# Patient Record
Sex: FEMALE | Race: BLACK OR AFRICAN AMERICAN | NOT HISPANIC OR LATINO
[De-identification: names, ages, dates, MRNs, and addresses within clinical notes are randomized per-mention and may not be internally consistent; named-entity substitution may affect disease eponyms.]

---

## 2018-07-27 PROBLEM — Z00.00 ENCOUNTER FOR PREVENTIVE HEALTH EXAMINATION: Status: ACTIVE | Noted: 2018-07-27

## 2018-08-03 ENCOUNTER — APPOINTMENT (OUTPATIENT)
Dept: VASCULAR SURGERY | Facility: CLINIC | Age: 73
End: 2018-08-03
Payer: MEDICARE

## 2018-08-03 VITALS
WEIGHT: 173 LBS | HEIGHT: 62 IN | BODY MASS INDEX: 31.83 KG/M2 | DIASTOLIC BLOOD PRESSURE: 69 MMHG | SYSTOLIC BLOOD PRESSURE: 108 MMHG | HEART RATE: 62 BPM

## 2018-08-03 DIAGNOSIS — Z86.79 PERSONAL HISTORY OF OTHER DISEASES OF THE CIRCULATORY SYSTEM: ICD-10-CM

## 2018-08-03 DIAGNOSIS — Z86.39 PERSONAL HISTORY OF OTHER ENDOCRINE, NUTRITIONAL AND METABOLIC DISEASE: ICD-10-CM

## 2018-08-03 DIAGNOSIS — I65.29 OCCLUSION AND STENOSIS OF UNSPECIFIED CAROTID ARTERY: ICD-10-CM

## 2018-08-03 PROCEDURE — 99203 OFFICE O/P NEW LOW 30 MIN: CPT

## 2018-08-03 RX ORDER — LOSARTAN POTASSIUM AND HYDROCHLOROTHIAZIDE 25; 100 MG/1; MG/1
100-25 TABLET ORAL
Refills: 0 | Status: ACTIVE | COMMUNITY

## 2018-08-03 RX ORDER — SIMVASTATIN 40 MG/1
40 TABLET, FILM COATED ORAL
Refills: 0 | Status: ACTIVE | COMMUNITY

## 2018-08-03 RX ORDER — NIFEDIPINE 90 MG
90 TABLET, EXTENDED RELEASE ORAL
Refills: 0 | Status: ACTIVE | COMMUNITY

## 2018-08-03 RX ORDER — ASPIRIN 81 MG
81 TABLET, DELAYED RELEASE (ENTERIC COATED) ORAL
Refills: 0 | Status: ACTIVE | COMMUNITY

## 2018-08-03 RX ORDER — METFORMIN HYDROCHLORIDE 500 MG/1
500 TABLET, COATED ORAL
Refills: 0 | Status: ACTIVE | COMMUNITY

## 2018-08-03 RX ORDER — ATENOLOL 50 MG/1
50 TABLET ORAL
Refills: 0 | Status: ACTIVE | COMMUNITY

## 2019-02-01 ENCOUNTER — APPOINTMENT (OUTPATIENT)
Dept: VASCULAR SURGERY | Facility: CLINIC | Age: 74
End: 2019-02-01
Payer: MEDICARE

## 2019-02-01 PROCEDURE — 93880 EXTRACRANIAL BILAT STUDY: CPT

## 2019-02-15 ENCOUNTER — APPOINTMENT (OUTPATIENT)
Dept: VASCULAR SURGERY | Facility: CLINIC | Age: 74
End: 2019-02-15
Payer: MEDICARE

## 2019-02-15 VITALS
BODY MASS INDEX: 31.83 KG/M2 | HEART RATE: 70 BPM | WEIGHT: 173 LBS | SYSTOLIC BLOOD PRESSURE: 148 MMHG | HEIGHT: 62 IN | DIASTOLIC BLOOD PRESSURE: 76 MMHG

## 2019-02-15 PROCEDURE — 99214 OFFICE O/P EST MOD 30 MIN: CPT

## 2019-02-15 NOTE — PHYSICAL EXAM
[JVD] : no jugular venous distention  [Normal Breath Sounds] : Normal breath sounds [Right Carotid Bruit] : right carotid bruit heard [Left Carotid Bruit] : left carotid bruit heard [2+] : left 2+ [Ankle Swelling (On Exam)] : not present [Varicose Veins Of Lower Extremities] : not present [] : not present [Abdomen Masses] : No abdominal masses [Skin Ulcer] : no ulcer [Oriented to Place] : oriented to place

## 2019-02-15 NOTE — HISTORY OF PRESENT ILLNESS
[FreeTextEntry1] : The patient with moderate to severe left carotid stenosis which is asymptomatic. Patient was also noted to have thyroid nodules. Patient currently undergoing workup for the thyroid nodules. Patient currently on antiplatelet therapy.

## 2019-02-15 NOTE — ASSESSMENT
[FreeTextEntry1] : Patient with moderate to severe asymptomatic left carotid stenosis. Patient needs left carotid endarterectomy. I would like to complete the workup for thyroid nodules at this time prior to intervention. This was discussed with the patient and primary doctor. Will followup in 3 weeks.

## 2019-03-12 ENCOUNTER — OUTPATIENT (OUTPATIENT)
Dept: OUTPATIENT SERVICES | Facility: HOSPITAL | Age: 74
LOS: 1 days | End: 2019-03-12
Payer: MEDICARE

## 2019-03-12 VITALS
TEMPERATURE: 98 F | DIASTOLIC BLOOD PRESSURE: 63 MMHG | HEIGHT: 62 IN | WEIGHT: 169.98 LBS | RESPIRATION RATE: 18 BRPM | HEART RATE: 54 BPM | OXYGEN SATURATION: 99 % | SYSTOLIC BLOOD PRESSURE: 157 MMHG

## 2019-03-12 DIAGNOSIS — Z01.818 ENCOUNTER FOR OTHER PREPROCEDURAL EXAMINATION: ICD-10-CM

## 2019-03-12 DIAGNOSIS — I65.29 OCCLUSION AND STENOSIS OF UNSPECIFIED CAROTID ARTERY: ICD-10-CM

## 2019-03-12 DIAGNOSIS — I10 ESSENTIAL (PRIMARY) HYPERTENSION: ICD-10-CM

## 2019-03-12 LAB
ALBUMIN SERPL ELPH-MCNC: 3.6 G/DL — SIGNIFICANT CHANGE UP (ref 3.5–5)
ALP SERPL-CCNC: 88 U/L — SIGNIFICANT CHANGE UP (ref 40–120)
ALT FLD-CCNC: 15 U/L DA — SIGNIFICANT CHANGE UP (ref 10–60)
ANION GAP SERPL CALC-SCNC: 7 MMOL/L — SIGNIFICANT CHANGE UP (ref 5–17)
APPEARANCE UR: CLEAR — SIGNIFICANT CHANGE UP
APTT BLD: 32.7 SEC — SIGNIFICANT CHANGE UP (ref 27.5–36.3)
AST SERPL-CCNC: 12 U/L — SIGNIFICANT CHANGE UP (ref 10–40)
BACTERIA # UR AUTO: ABNORMAL /HPF
BILIRUB SERPL-MCNC: 0.4 MG/DL — SIGNIFICANT CHANGE UP (ref 0.2–1.2)
BILIRUB UR-MCNC: NEGATIVE — SIGNIFICANT CHANGE UP
BLD GP AB SCN SERPL QL: SIGNIFICANT CHANGE UP
BUN SERPL-MCNC: 16 MG/DL — SIGNIFICANT CHANGE UP (ref 7–18)
CALCIUM SERPL-MCNC: 8.7 MG/DL — SIGNIFICANT CHANGE UP (ref 8.4–10.5)
CHLORIDE SERPL-SCNC: 111 MMOL/L — HIGH (ref 96–108)
CO2 SERPL-SCNC: 25 MMOL/L — SIGNIFICANT CHANGE UP (ref 22–31)
COLOR SPEC: YELLOW — SIGNIFICANT CHANGE UP
COMMENT - URINE: SIGNIFICANT CHANGE UP
CREAT SERPL-MCNC: 1.12 MG/DL — SIGNIFICANT CHANGE UP (ref 0.5–1.3)
DIFF PNL FLD: ABNORMAL
EPI CELLS # UR: SIGNIFICANT CHANGE UP /HPF
GLUCOSE SERPL-MCNC: 78 MG/DL — SIGNIFICANT CHANGE UP (ref 70–99)
GLUCOSE UR QL: NEGATIVE — SIGNIFICANT CHANGE UP
HBA1C BLD-MCNC: 6 % — HIGH (ref 4–5.6)
HCT VFR BLD CALC: 39.9 % — SIGNIFICANT CHANGE UP (ref 34.5–45)
HGB BLD-MCNC: 12.8 G/DL — SIGNIFICANT CHANGE UP (ref 11.5–15.5)
INR BLD: 0.99 RATIO — SIGNIFICANT CHANGE UP (ref 0.88–1.16)
KETONES UR-MCNC: NEGATIVE — SIGNIFICANT CHANGE UP
LEUKOCYTE ESTERASE UR-ACNC: ABNORMAL
MCHC RBC-ENTMCNC: 29.7 PG — SIGNIFICANT CHANGE UP (ref 27–34)
MCHC RBC-ENTMCNC: 32.1 GM/DL — SIGNIFICANT CHANGE UP (ref 32–36)
MCV RBC AUTO: 92.6 FL — SIGNIFICANT CHANGE UP (ref 80–100)
NITRITE UR-MCNC: NEGATIVE — SIGNIFICANT CHANGE UP
NRBC # BLD: 0 /100 WBCS — SIGNIFICANT CHANGE UP (ref 0–0)
PH UR: 5 — SIGNIFICANT CHANGE UP (ref 5–8)
PLATELET # BLD AUTO: 219 K/UL — SIGNIFICANT CHANGE UP (ref 150–400)
POTASSIUM SERPL-MCNC: 3.9 MMOL/L — SIGNIFICANT CHANGE UP (ref 3.5–5.3)
POTASSIUM SERPL-SCNC: 3.9 MMOL/L — SIGNIFICANT CHANGE UP (ref 3.5–5.3)
PROT SERPL-MCNC: 7.6 G/DL — SIGNIFICANT CHANGE UP (ref 6–8.3)
PROT UR-MCNC: 15
PROTHROM AB SERPL-ACNC: 11 SEC — SIGNIFICANT CHANGE UP (ref 10–12.9)
RBC # BLD: 4.31 M/UL — SIGNIFICANT CHANGE UP (ref 3.8–5.2)
RBC # FLD: 12.9 % — SIGNIFICANT CHANGE UP (ref 10.3–14.5)
RBC CASTS # UR COMP ASSIST: SIGNIFICANT CHANGE UP /HPF (ref 0–2)
SODIUM SERPL-SCNC: 143 MMOL/L — SIGNIFICANT CHANGE UP (ref 135–145)
SP GR SPEC: 1.02 — SIGNIFICANT CHANGE UP (ref 1.01–1.02)
T3 SERPL-MCNC: 84 NG/DL — SIGNIFICANT CHANGE UP (ref 80–200)
T4 AB SER-ACNC: 7.1 UG/DL — SIGNIFICANT CHANGE UP (ref 4.6–12)
TSH SERPL-MCNC: 0.65 UU/ML — SIGNIFICANT CHANGE UP (ref 0.34–4.82)
UROBILINOGEN FLD QL: 1
WBC # BLD: 7.1 K/UL — SIGNIFICANT CHANGE UP (ref 3.8–10.5)
WBC # FLD AUTO: 7.1 K/UL — SIGNIFICANT CHANGE UP (ref 3.8–10.5)
WBC UR QL: SIGNIFICANT CHANGE UP /HPF (ref 0–5)

## 2019-03-12 PROCEDURE — 36415 COLL VENOUS BLD VENIPUNCTURE: CPT

## 2019-03-12 PROCEDURE — 84443 ASSAY THYROID STIM HORMONE: CPT

## 2019-03-12 PROCEDURE — 84480 ASSAY TRIIODOTHYRONINE (T3): CPT

## 2019-03-12 PROCEDURE — 93005 ELECTROCARDIOGRAM TRACING: CPT

## 2019-03-12 PROCEDURE — 93306 TTE W/DOPPLER COMPLETE: CPT

## 2019-03-12 PROCEDURE — G0463: CPT

## 2019-03-12 PROCEDURE — 80053 COMPREHEN METABOLIC PANEL: CPT

## 2019-03-12 PROCEDURE — 81001 URINALYSIS AUTO W/SCOPE: CPT

## 2019-03-12 PROCEDURE — 86900 BLOOD TYPING SEROLOGIC ABO: CPT

## 2019-03-12 PROCEDURE — 83036 HEMOGLOBIN GLYCOSYLATED A1C: CPT

## 2019-03-12 PROCEDURE — 86901 BLOOD TYPING SEROLOGIC RH(D): CPT

## 2019-03-12 PROCEDURE — 86850 RBC ANTIBODY SCREEN: CPT

## 2019-03-12 PROCEDURE — 85610 PROTHROMBIN TIME: CPT

## 2019-03-12 PROCEDURE — 85027 COMPLETE CBC AUTOMATED: CPT

## 2019-03-12 PROCEDURE — 85730 THROMBOPLASTIN TIME PARTIAL: CPT

## 2019-03-12 PROCEDURE — 84436 ASSAY OF TOTAL THYROXINE: CPT

## 2019-03-12 NOTE — H&P PST ADULT - NSICDXPROBLEM_GEN_ALL_CORE_FT
PROBLEM DIAGNOSES  Problem: Hypertension  Assessment and Plan: Continue antihypertensive meds and take with sips of water on day of surgery.     Problem: Carotid stenosis  Assessment and Plan: left carotid endarectomy with electroencephalogram neuromonitoring on 3/28/2019 PROBLEM DIAGNOSES  Problem: Hypertension  Assessment and Plan: Continue antihypertensive meds and take with sips of water on day of surgery.     Problem: Carotid stenosis  Assessment and Plan: left carotid endarterectomy with electroencephalogram neuromonitoring on 3/28/2019 PROBLEM DIAGNOSES  Problem: Multiple thyroid nodules  Assessment and Plan: H/o FNA of left lower pole nodule on 2/22/2019. pt to return in 1 year to see endocridonologist and for repeat sonogram     Problem: DM (diabetes mellitus)  Assessment and Plan: Continue medication and take with sips of water on day of surgery. Perioperative glucose monitoring and cover as needed.    Problem: Hypertension  Assessment and Plan: Continue antihypertensive meds and take with sips of water on day of surgery.     Problem: Carotid stenosis  Assessment and Plan: left carotid endarterectomy with electroencephalogram neuromonitoring on 3/28/2019 PROBLEM DIAGNOSES  Problem: Multiple thyroid nodules  Assessment and Plan: H/o FNA of left lower pole nodule on 2/22/2019. pt to return in 1 year to see endocrinologist and for repeat sonogram     Problem: DM (diabetes mellitus)  Assessment and Plan: Continue medication and take with sips of water on day of surgery. Perioperative glucose monitoring and cover as needed.    Problem: Hypertension  Assessment and Plan: Continue antihypertensive meds and take with sips of water on day of surgery.     Problem: Carotid stenosis  Assessment and Plan: left carotid endarterectomy with electroencephalogram neuromonitoring on 3/28/2019

## 2019-03-12 NOTE — H&P PST ADULT - GASTROINTESTINAL DETAILS
bowel sounds normal/no bruit/no rebound tenderness/nontender/no distention/soft/no masses palpable/normal

## 2019-03-12 NOTE — H&P PST ADULT - NSANTHOSAYNRD_GEN_A_CORE
No. DONNY screening performed.  STOP BANG Legend: 0-2 = LOW Risk; 3-4 = INTERMEDIATE Risk; 5-8 = HIGH Risk

## 2019-03-12 NOTE — H&P PST ADULT - NEGATIVE GASTROINTESTINAL SYMPTOMS
no change in bowel habits/no flatulence/no abdominal pain/no diarrhea/no vomiting/no constipation/no nausea

## 2019-03-12 NOTE — H&P PST ADULT - RS GEN PE MLT RESP DETAILS PC
clear to auscultation bilaterally/no chest wall tenderness/no subcutaneous emphysema/no wheezes/no rhonchi/respirations non-labored/normal/breath sounds equal/good air movement/no rales/no intercostal retractions/airway patent

## 2019-03-12 NOTE — H&P PST ADULT - NSICDXPASTSURGICALHX_GEN_ALL_CORE_FT
PAST SURGICAL HISTORY:  History of left breast biopsy     Status post biopsy of thyroid gland FNA of thyroid nodules

## 2019-03-12 NOTE — H&P PST ADULT - ASSESSMENT
73 yr old female with PMH of HTN, HLD, thyroid nodules presents with left carotid artery occlusion and stenosis. Pt for left carotid endarterectomy with eletroencephalogram neuromonitoring on 3/28/2019. 73 yr old female with PMH of HTN, Hyperlipidemia, DM, PVD, thyroid nodules presents with left carotid artery occlusion and stenosis. Pt for left carotid endarterectomy with electroencephalogram neuromonitoring on 3/28/2019.

## 2019-03-12 NOTE — H&P PST ADULT - HISTORY OF PRESENT ILLNESS
73 yr old female with PMH of HTN, HLD presents with c/o stenosis of left carotid artery. pt denies any symptoms. pt for left carotid endarterectomy with eletroencephalogram neuromonitoring on 3/28/2019. 73 yr old female with PMH of HTN, Hyperlipidemia, DM, thyroid nodules, PVD presents with c/o stenosis of left carotid artery discovered incidentally during work-up for thyroid nodules. Pt denies any symptoms. Pt for left carotid endarterectomy with electroencephalogram neuromonitoring on 3/28/2019.

## 2019-03-12 NOTE — H&P PST ADULT - NEGATIVE CARDIOVASCULAR SYMPTOMS
no peripheral edema/no dyspnea on exertion/no palpitations/no chest pain/no claudication/no orthopnea/no paroxysmal nocturnal dyspnea

## 2019-03-12 NOTE — H&P PST ADULT - NSICDXPASTMEDICALHX_GEN_ALL_CORE_FT
PAST MEDICAL HISTORY:  Carotid artery stenosis     Hypertension PAST MEDICAL HISTORY:  Carotid artery stenosis     DM (diabetes mellitus)     Hypertension     Multiple thyroid nodules     PVD (peripheral vascular disease)

## 2019-03-15 ENCOUNTER — APPOINTMENT (OUTPATIENT)
Dept: VASCULAR SURGERY | Facility: CLINIC | Age: 74
End: 2019-03-15

## 2019-03-15 DIAGNOSIS — E04.2 NONTOXIC MULTINODULAR GOITER: ICD-10-CM

## 2019-03-15 DIAGNOSIS — E11.9 TYPE 2 DIABETES MELLITUS WITHOUT COMPLICATIONS: ICD-10-CM

## 2019-03-27 ENCOUNTER — TRANSCRIPTION ENCOUNTER (OUTPATIENT)
Age: 74
End: 2019-03-27

## 2019-03-27 PROBLEM — E04.2 NONTOXIC MULTINODULAR GOITER: Chronic | Status: ACTIVE | Noted: 2019-03-15

## 2019-03-27 PROBLEM — I65.29 OCCLUSION AND STENOSIS OF UNSPECIFIED CAROTID ARTERY: Chronic | Status: ACTIVE | Noted: 2019-03-12

## 2019-03-27 PROBLEM — I10 ESSENTIAL (PRIMARY) HYPERTENSION: Chronic | Status: ACTIVE | Noted: 2019-03-12

## 2019-03-27 PROBLEM — I73.9 PERIPHERAL VASCULAR DISEASE, UNSPECIFIED: Chronic | Status: ACTIVE | Noted: 2019-03-15

## 2019-03-27 PROBLEM — E11.9 TYPE 2 DIABETES MELLITUS WITHOUT COMPLICATIONS: Chronic | Status: ACTIVE | Noted: 2019-03-15

## 2019-03-27 RX ORDER — SODIUM CHLORIDE 9 MG/ML
3 INJECTION INTRAMUSCULAR; INTRAVENOUS; SUBCUTANEOUS EVERY 8 HOURS
Qty: 0 | Refills: 0 | Status: DISCONTINUED | OUTPATIENT
Start: 2019-03-28 | End: 2019-04-02

## 2019-03-28 ENCOUNTER — APPOINTMENT (OUTPATIENT)
Dept: VASCULAR SURGERY | Facility: HOSPITAL | Age: 74
End: 2019-03-28
Payer: MEDICARE

## 2019-03-28 ENCOUNTER — INPATIENT (INPATIENT)
Facility: HOSPITAL | Age: 74
LOS: 4 days | Discharge: HOME | DRG: 27 | End: 2019-04-02
Attending: SURGERY | Admitting: SURGERY
Payer: MEDICARE

## 2019-03-28 ENCOUNTER — RESULT REVIEW (OUTPATIENT)
Age: 74
End: 2019-03-28

## 2019-03-28 VITALS
OXYGEN SATURATION: 97 % | TEMPERATURE: 98 F | HEART RATE: 49 BPM | RESPIRATION RATE: 16 BRPM | DIASTOLIC BLOOD PRESSURE: 50 MMHG | SYSTOLIC BLOOD PRESSURE: 114 MMHG | WEIGHT: 169.98 LBS | HEIGHT: 62 IN

## 2019-03-28 DIAGNOSIS — Z98.890 OTHER SPECIFIED POSTPROCEDURAL STATES: Chronic | ICD-10-CM

## 2019-03-28 DIAGNOSIS — I65.29 OCCLUSION AND STENOSIS OF UNSPECIFIED CAROTID ARTERY: ICD-10-CM

## 2019-03-28 LAB
ANION GAP SERPL CALC-SCNC: 5 MMOL/L — SIGNIFICANT CHANGE UP (ref 5–17)
BASOPHILS # BLD AUTO: 0.02 K/UL — SIGNIFICANT CHANGE UP (ref 0–0.2)
BASOPHILS NFR BLD AUTO: 0.2 % — SIGNIFICANT CHANGE UP (ref 0–2)
BLD GP AB SCN SERPL QL: SIGNIFICANT CHANGE UP
BUN SERPL-MCNC: 19 MG/DL — HIGH (ref 7–18)
CALCIUM SERPL-MCNC: 8.8 MG/DL — SIGNIFICANT CHANGE UP (ref 8.4–10.5)
CHLORIDE SERPL-SCNC: 109 MMOL/L — HIGH (ref 96–108)
CHOLEST SERPL-MCNC: 196 MG/DL — SIGNIFICANT CHANGE UP (ref 10–199)
CO2 SERPL-SCNC: 25 MMOL/L — SIGNIFICANT CHANGE UP (ref 22–31)
CREAT SERPL-MCNC: 1.13 MG/DL — SIGNIFICANT CHANGE UP (ref 0.5–1.3)
EOSINOPHIL # BLD AUTO: 0 K/UL — SIGNIFICANT CHANGE UP (ref 0–0.5)
EOSINOPHIL NFR BLD AUTO: 0 % — SIGNIFICANT CHANGE UP (ref 0–6)
GLUCOSE BLDC GLUCOMTR-MCNC: 91 MG/DL — SIGNIFICANT CHANGE UP (ref 70–99)
GLUCOSE SERPL-MCNC: 138 MG/DL — HIGH (ref 70–99)
HCT VFR BLD CALC: 40.2 % — SIGNIFICANT CHANGE UP (ref 34.5–45)
HDLC SERPL-MCNC: 69 MG/DL — SIGNIFICANT CHANGE UP
HGB BLD-MCNC: 13 G/DL — SIGNIFICANT CHANGE UP (ref 11.5–15.5)
IMM GRANULOCYTES NFR BLD AUTO: 0.7 % — SIGNIFICANT CHANGE UP (ref 0–1.5)
INR BLD: 1.02 RATIO — SIGNIFICANT CHANGE UP (ref 0.88–1.16)
LIPID PNL WITH DIRECT LDL SERPL: 112 MG/DL — SIGNIFICANT CHANGE UP
LYMPHOCYTES # BLD AUTO: 0.83 K/UL — LOW (ref 1–3.3)
LYMPHOCYTES # BLD AUTO: 7.2 % — LOW (ref 13–44)
MAGNESIUM SERPL-MCNC: 1.8 MG/DL — SIGNIFICANT CHANGE UP (ref 1.6–2.6)
MAGNESIUM SERPL-MCNC: 1.8 MG/DL — SIGNIFICANT CHANGE UP (ref 1.6–2.6)
MCHC RBC-ENTMCNC: 29.7 PG — SIGNIFICANT CHANGE UP (ref 27–34)
MCHC RBC-ENTMCNC: 32.3 GM/DL — SIGNIFICANT CHANGE UP (ref 32–36)
MCV RBC AUTO: 92 FL — SIGNIFICANT CHANGE UP (ref 80–100)
MONOCYTES # BLD AUTO: 0.13 K/UL — SIGNIFICANT CHANGE UP (ref 0–0.9)
MONOCYTES NFR BLD AUTO: 1.1 % — LOW (ref 2–14)
NEUTROPHILS # BLD AUTO: 10.51 K/UL — HIGH (ref 1.8–7.4)
NEUTROPHILS NFR BLD AUTO: 90.8 % — HIGH (ref 43–77)
NRBC # BLD: 0 /100 WBCS — SIGNIFICANT CHANGE UP (ref 0–0)
PHOSPHATE SERPL-MCNC: 2.4 MG/DL — LOW (ref 2.5–4.5)
PHOSPHATE SERPL-MCNC: 3 MG/DL — SIGNIFICANT CHANGE UP (ref 2.5–4.5)
PLATELET # BLD AUTO: 221 K/UL — SIGNIFICANT CHANGE UP (ref 150–400)
POTASSIUM SERPL-MCNC: 4.2 MMOL/L — SIGNIFICANT CHANGE UP (ref 3.5–5.3)
POTASSIUM SERPL-SCNC: 4.2 MMOL/L — SIGNIFICANT CHANGE UP (ref 3.5–5.3)
PROTHROM AB SERPL-ACNC: 11.3 SEC — SIGNIFICANT CHANGE UP (ref 10–12.9)
RBC # BLD: 4.37 M/UL — SIGNIFICANT CHANGE UP (ref 3.8–5.2)
RBC # FLD: 12.9 % — SIGNIFICANT CHANGE UP (ref 10.3–14.5)
SODIUM SERPL-SCNC: 139 MMOL/L — SIGNIFICANT CHANGE UP (ref 135–145)
TOTAL CHOLESTEROL/HDL RATIO MEASUREMENT: 2.8 RATIO — LOW (ref 3.3–7.1)
TRIGL SERPL-MCNC: 77 MG/DL — SIGNIFICANT CHANGE UP (ref 10–149)
WBC # BLD: 11.57 K/UL — HIGH (ref 3.8–10.5)
WBC # FLD AUTO: 11.57 K/UL — HIGH (ref 3.8–10.5)

## 2019-03-28 PROCEDURE — 35301 RECHANNELING OF ARTERY: CPT | Mod: AS

## 2019-03-28 PROCEDURE — 35301 RECHANNELING OF ARTERY: CPT

## 2019-03-28 RX ORDER — ATENOLOL 25 MG/1
50 TABLET ORAL DAILY
Qty: 0 | Refills: 0 | Status: DISCONTINUED | OUTPATIENT
Start: 2019-03-28 | End: 2019-03-30

## 2019-03-28 RX ORDER — SIMVASTATIN 20 MG/1
40 TABLET, FILM COATED ORAL AT BEDTIME
Qty: 0 | Refills: 0 | Status: DISCONTINUED | OUTPATIENT
Start: 2019-03-28 | End: 2019-04-02

## 2019-03-28 RX ORDER — GLUCAGON INJECTION, SOLUTION 0.5 MG/.1ML
1 INJECTION, SOLUTION SUBCUTANEOUS ONCE
Qty: 0 | Refills: 0 | Status: DISCONTINUED | OUTPATIENT
Start: 2019-03-28 | End: 2019-03-28

## 2019-03-28 RX ORDER — ASPIRIN/CALCIUM CARB/MAGNESIUM 324 MG
81 TABLET ORAL DAILY
Qty: 0 | Refills: 0 | Status: DISCONTINUED | OUTPATIENT
Start: 2019-03-28 | End: 2019-04-02

## 2019-03-28 RX ORDER — SODIUM CHLORIDE 9 MG/ML
1000 INJECTION, SOLUTION INTRAVENOUS
Qty: 0 | Refills: 0 | Status: DISCONTINUED | OUTPATIENT
Start: 2019-03-28 | End: 2019-03-28

## 2019-03-28 RX ORDER — METOPROLOL TARTRATE 50 MG
2.5 TABLET ORAL ONCE
Qty: 0 | Refills: 0 | Status: COMPLETED | OUTPATIENT
Start: 2019-03-28 | End: 2019-03-28

## 2019-03-28 RX ORDER — HYDROMORPHONE HYDROCHLORIDE 2 MG/ML
0.5 INJECTION INTRAMUSCULAR; INTRAVENOUS; SUBCUTANEOUS
Qty: 0 | Refills: 0 | Status: DISCONTINUED | OUTPATIENT
Start: 2019-03-28 | End: 2019-03-28

## 2019-03-28 RX ORDER — LABETALOL HCL 100 MG
10 TABLET ORAL ONCE
Qty: 0 | Refills: 0 | Status: COMPLETED | OUTPATIENT
Start: 2019-03-28 | End: 2019-03-28

## 2019-03-28 RX ORDER — ATENOLOL 25 MG/1
50 TABLET ORAL DAILY
Qty: 0 | Refills: 0 | Status: DISCONTINUED | OUTPATIENT
Start: 2019-03-28 | End: 2019-03-28

## 2019-03-28 RX ORDER — MAGNESIUM SULFATE 500 MG/ML
1 VIAL (ML) INJECTION ONCE
Qty: 0 | Refills: 0 | Status: COMPLETED | OUTPATIENT
Start: 2019-03-28 | End: 2019-03-28

## 2019-03-28 RX ORDER — LOSARTAN POTASSIUM 100 MG/1
100 TABLET, FILM COATED ORAL DAILY
Qty: 0 | Refills: 0 | Status: DISCONTINUED | OUTPATIENT
Start: 2019-03-28 | End: 2019-03-30

## 2019-03-28 RX ORDER — DEXTROSE 50 % IN WATER 50 %
25 SYRINGE (ML) INTRAVENOUS ONCE
Qty: 0 | Refills: 0 | Status: DISCONTINUED | OUTPATIENT
Start: 2019-03-28 | End: 2019-03-28

## 2019-03-28 RX ORDER — ACETAMINOPHEN 500 MG
1000 TABLET ORAL ONCE
Qty: 0 | Refills: 0 | Status: DISCONTINUED | OUTPATIENT
Start: 2019-03-28 | End: 2019-03-28

## 2019-03-28 RX ORDER — ONDANSETRON 8 MG/1
4 TABLET, FILM COATED ORAL ONCE
Qty: 0 | Refills: 0 | Status: DISCONTINUED | OUTPATIENT
Start: 2019-03-28 | End: 2019-03-28

## 2019-03-28 RX ORDER — CHLORHEXIDINE GLUCONATE 213 G/1000ML
1 SOLUTION TOPICAL DAILY
Qty: 0 | Refills: 0 | Status: DISCONTINUED | OUTPATIENT
Start: 2019-03-28 | End: 2019-03-28

## 2019-03-28 RX ORDER — LOSARTAN POTASSIUM 100 MG/1
1 TABLET, FILM COATED ORAL
Qty: 0 | Refills: 0 | COMMUNITY

## 2019-03-28 RX ORDER — SIMVASTATIN 20 MG/1
1 TABLET, FILM COATED ORAL
Qty: 0 | Refills: 0 | COMMUNITY

## 2019-03-28 RX ORDER — CHLORHEXIDINE GLUCONATE 213 G/1000ML
1 SOLUTION TOPICAL EVERY 12 HOURS
Qty: 0 | Refills: 0 | Status: DISCONTINUED | OUTPATIENT
Start: 2019-03-28 | End: 2019-04-02

## 2019-03-28 RX ORDER — INSULIN LISPRO 100/ML
VIAL (ML) SUBCUTANEOUS
Qty: 0 | Refills: 0 | Status: DISCONTINUED | OUTPATIENT
Start: 2019-03-28 | End: 2019-04-02

## 2019-03-28 RX ORDER — DEXTROSE 50 % IN WATER 50 %
15 SYRINGE (ML) INTRAVENOUS ONCE
Qty: 0 | Refills: 0 | Status: DISCONTINUED | OUTPATIENT
Start: 2019-03-28 | End: 2019-03-28

## 2019-03-28 RX ORDER — SODIUM CHLORIDE 9 MG/ML
1000 INJECTION INTRAMUSCULAR; INTRAVENOUS; SUBCUTANEOUS
Qty: 0 | Refills: 0 | Status: DISCONTINUED | OUTPATIENT
Start: 2019-03-28 | End: 2019-03-28

## 2019-03-28 RX ORDER — NIFEDIPINE 30 MG
1 TABLET, EXTENDED RELEASE 24 HR ORAL
Qty: 0 | Refills: 0 | COMMUNITY

## 2019-03-28 RX ORDER — DEXTROSE 50 % IN WATER 50 %
12.5 SYRINGE (ML) INTRAVENOUS ONCE
Qty: 0 | Refills: 0 | Status: DISCONTINUED | OUTPATIENT
Start: 2019-03-28 | End: 2019-03-28

## 2019-03-28 RX ORDER — ATENOLOL 25 MG/1
1 TABLET ORAL
Qty: 0 | Refills: 0 | COMMUNITY

## 2019-03-28 RX ORDER — NICARDIPINE HYDROCHLORIDE 30 MG/1
5 CAPSULE, EXTENDED RELEASE ORAL
Qty: 40 | Refills: 0 | Status: DISCONTINUED | OUTPATIENT
Start: 2019-03-28 | End: 2019-03-30

## 2019-03-28 RX ORDER — NIFEDIPINE 30 MG
90 TABLET, EXTENDED RELEASE 24 HR ORAL AT BEDTIME
Qty: 0 | Refills: 0 | Status: DISCONTINUED | OUTPATIENT
Start: 2019-03-28 | End: 2019-03-28

## 2019-03-28 RX ORDER — ASPIRIN/CALCIUM CARB/MAGNESIUM 324 MG
1 TABLET ORAL
Qty: 0 | Refills: 0 | COMMUNITY

## 2019-03-28 RX ORDER — PROTAMINE SULFATE 10 MG/ML
50 AMPUL (ML) INTRAVENOUS ONCE
Qty: 0 | Refills: 0 | Status: COMPLETED | OUTPATIENT
Start: 2019-03-28 | End: 2019-03-28

## 2019-03-28 RX ORDER — METFORMIN HYDROCHLORIDE 850 MG/1
1 TABLET ORAL
Qty: 0 | Refills: 0 | COMMUNITY

## 2019-03-28 RX ADMIN — Medication 50 MILLIGRAM(S): at 13:05

## 2019-03-28 RX ADMIN — Medication 100 GRAM(S): at 15:48

## 2019-03-28 RX ADMIN — NICARDIPINE HYDROCHLORIDE 25 MG/HR: 30 CAPSULE, EXTENDED RELEASE ORAL at 16:35

## 2019-03-28 RX ADMIN — CHLORHEXIDINE GLUCONATE 1 APPLICATION(S): 213 SOLUTION TOPICAL at 15:42

## 2019-03-28 RX ADMIN — SODIUM CHLORIDE 3 MILLILITER(S): 9 INJECTION INTRAMUSCULAR; INTRAVENOUS; SUBCUTANEOUS at 20:50

## 2019-03-28 RX ADMIN — LOSARTAN POTASSIUM 100 MILLIGRAM(S): 100 TABLET, FILM COATED ORAL at 13:11

## 2019-03-28 RX ADMIN — Medication 10 MILLIGRAM(S): at 16:17

## 2019-03-28 RX ADMIN — CHLORHEXIDINE GLUCONATE 1 APPLICATION(S): 213 SOLUTION TOPICAL at 08:05

## 2019-03-28 RX ADMIN — SODIUM CHLORIDE 3 MILLILITER(S): 9 INJECTION INTRAMUSCULAR; INTRAVENOUS; SUBCUTANEOUS at 07:42

## 2019-03-28 RX ADMIN — SODIUM CHLORIDE 85 MILLILITER(S): 9 INJECTION, SOLUTION INTRAVENOUS at 12:59

## 2019-03-28 RX ADMIN — Medication 2.5 MILLIGRAM(S): at 13:09

## 2019-03-28 RX ADMIN — ATENOLOL 50 MILLIGRAM(S): 25 TABLET ORAL at 15:48

## 2019-03-28 RX ADMIN — SIMVASTATIN 40 MILLIGRAM(S): 20 TABLET, FILM COATED ORAL at 21:02

## 2019-03-28 NOTE — CONSULT NOTE ADULT - ATTENDING COMMENTS
Patient seen and examined with resident, Addendum to above.    72 y/o female with history of DM. HTN, HLD incidentally found to have carotid stenosis of left side. S/p left Carotid endarterectomy. Admitted to the ICU for post operative monitoring.    Assessment:  1. Left carotid artery stenosis - s/p left Carotid endarterectomy. Neurovascular checks. Maintain BP between 110 - 160 systolic. Vascular surgery follow up. Monitor TIARA drain output. Oral diet for dinner.   2. DM - hold home oral medications. Fingerstick monitoring. Basal/bolus insulin regimen  3. HTN - BP control cont. home medications.   4. HLD - cont. statin     - DVT prophylaxis   - Oral diet

## 2019-03-28 NOTE — PROGRESS NOTE ADULT - SUBJECTIVE AND OBJECTIVE BOX
S/P Left Carotid endarterectomy POD#0  73y old Female seen and examined at bedside. Denies chest pain, shortness of breath, nausea/ vomiting, and dizziness. Denies neck pain, numbness tingling, loss of sensation, difficulty swallowing.     Vital Signs Last 24 Hrs  T(F): 98 (03-28-19 @ 16:30), Max: 98 (03-28-19 @ 07:45)  HR: 64 (03-28-19 @ 19:00)  BP: 113/40 (03-28-19 @ 19:00)  RR: 15 (03-28-19 @ 19:00)  SpO2: 100% (03-28-19 @ 19:00)  POCT Blood Glucose.: 127 mg/dL (28 Mar 2019 15:57)    GENERAL: Alert, NAD  CHEST/LUNG: respirations nonlabored  NECK: Left surgical dressing in place with small amount of blood staining, stable. TIARA with small amount of sanguineous drainage in bulb. No edema or swelling of the neck. NVI.   EXTREMITIES:  no calf tenderness, No edema, intermittent compression devices in place bilaterally                          13.0   11.57 )-----------( 221      ( 28 Mar 2019 14:02 )             40.2     03-28    139  |  109<H>  |  19<H>  ----------------------------<  138<H>  4.2   |  25  |  1.13    Ca    8.8      28 Mar 2019 14:02  Phos  3.0     03-28  Mg     1.8     03-28 S/P Left Carotid endarterectomy POD#0  73y old Female seen and examined at bedside. Denies chest pain, shortness of breath, nausea/ vomiting, headache and dizziness. Denies neck pain, numbness tingling, loss of sensation, difficulty swallowing.     Vital Signs Last 24 Hrs  T(F): 98 (03-28-19 @ 16:30), Max: 98 (03-28-19 @ 07:45)  HR: 64 (03-28-19 @ 19:00)  BP: 113/40 (03-28-19 @ 19:00)  RR: 15 (03-28-19 @ 19:00)  SpO2: 100% (03-28-19 @ 19:00)  POCT Blood Glucose.: 127 mg/dL (28 Mar 2019 15:57)    GENERAL: Alert, NAD  CHEST/LUNG: respirations nonlabored  NECK: Left surgical dressing in place with small amount of blood staining, stable. TIARA with small amount of sanguineous drainage in bulb. No edema or swelling of the neck. NVI.   EXTREMITIES:  no calf tenderness, No edema, intermittent compression devices in place bilaterally                          13.0   11.57 )-----------( 221      ( 28 Mar 2019 14:02 )             40.2     03-28    139  |  109<H>  |  19<H>  ----------------------------<  138<H>  4.2   |  25  |  1.13    Ca    8.8      28 Mar 2019 14:02  Phos  3.0     03-28  Mg     1.8     03-28

## 2019-03-28 NOTE — CONSULT NOTE ADULT - SUBJECTIVE AND OBJECTIVE BOX
73 yr old female with PMH of HTN, Hyperlipidemia, DM, thyroid nodules, PVD presents with c/o stenosis of left carotid artery discovered incidentally during work-up for thyroid nodules. Pt denies any symptoms as per surgery team. Pt freceived left carotid endarterectomy with electroencephalogram neuromonitoring on 3/28/2019. Patient was sent to ICU for post op monitoring. Estimated blood loss of 100ml and patient received 1L IV resuscitation during procedure. Pt intubated and extubated for surgery. Patient denied pain in the surgical site, headache, dizziness, visual and auditory hallucination, chest pain, palpitation, and any other symptoms.     REVIEW OF SYSTEMS:    CONSTITUTIONAL: No weakness, fevers or chills  EYES/ENT: No visual changes;  No vertigo or throat pain   NECK: No pain or stiffness  RESPIRATORY: No cough, wheezing, hemoptysis; No shortness of breath  CARDIOVASCULAR: No chest pain or palpitations  GASTROINTESTINAL: No abdominal or epigastric pain. No nausea, vomiting, or hematemesis; No diarrhea or constipation. No melena or hematochezia.  GENITOURINARY: No dysuria, frequency or hematuria  NEUROLOGICAL: No numbness or weakness  SKIN: No itching, rashes  No other complaint except mentioned as above.     PHYSICAL EXAM:    GENERAL: NAD, well-developed    HEAD:  Atraumatic, Normocephalic    EYES: EOMI, PERRLA, conjunctiva and sclera clear    NECK: Supple, No JVD, surgical site clean, no evident of bleeding, drain in place and empty    CHEST/LUNG: Clear to auscultation bilaterally; No wheeze    HEART: Regular rate and rhythm; No murmurs, rubs, or gallops    ABDOMEN: Soft, Nontender, Nondistended; Bowel sounds present    EXTREMITIES:  2+ Peripheral Pulses, No clubbing, cyanosis, or edema    PSYCH: AAOx3    NEUROLOGY: non-focal    SKIN: No rashes or lesions    No other pertinent positive finding except mentioned as above.           PAST MEDICAL & SURGICAL HISTORY:  PVD (peripheral vascular disease)  Multiple thyroid nodules  DM (diabetes mellitus)  Hypertension  Carotid artery stenosis  Status post biopsy of thyroid gland: FNA of thyroid nodules  History of left breast biopsy      No Known Allergies      Meds:  acetaminophen  IVPB .. 1000 milliGRAM(s) IV Intermittent once  aspirin enteric coated 81 milliGRAM(s) Oral daily  ATENolol  Tablet 50 milliGRAM(s) Oral daily  chlorhexidine 2% Cloths 1 Application(s) Topical daily  HYDROmorphone  Injectable 0.5 milliGRAM(s) IV Push every 10 minutes PRN  insulin lispro (HumaLOG) corrective regimen sliding scale   SubCutaneous three times a day before meals  lactated ringers. 1000 milliLiter(s) IV Continuous <Continuous>  losartan 100 milliGRAM(s) Oral daily  NIFEdipine XL 90 milliGRAM(s) Oral at bedtime  ondansetron Injectable 4 milliGRAM(s) IV Push once PRN  protamine Injectable 50 milliGRAM(s) IV Push once  simvastatin 40 milliGRAM(s) Oral at bedtime  sodium chloride 0.9% lock flush 3 milliLiter(s) IV Push every 8 hours      SOCIAL HISTORY:  Smoker:  YES / NO        PACK YEARS:                         WHEN QUIT?  ETOH use:  YES / NO               FREQUENCY / QUANTITY:  Ilicit Drug use:  YES / NO  Occupation:  Assisted device use (Cane / Walker):  Live with:    FAMILY HISTORY:  FHx: diabetes mellitus      VITALS:  Vital Signs Last 24 Hrs  T(C): 36.6 (28 Mar 2019 10:54), Max: 36.7 (28 Mar 2019 07:45)  T(F): 97.9 (28 Mar 2019 10:54), Max: 98 (28 Mar 2019 07:45)  HR: 74 (28 Mar 2019 12:10) (49 - 74)  BP: 137/56 (28 Mar 2019 12:10) (114/50 - 141/56)  BP(mean): --  RR: 18 (28 Mar 2019 12:10) (12 - 21)  SpO2: 100% (28 Mar 2019 12:10) (97% - 100%)    LABS/DIAGNOSTIC TESTS:            Mg     1.8     03-28 73 yr old female with PMH of HTN, Hyperlipidemia, DM, thyroid nodules, PVD presents with c/o stenosis of left carotid artery discovered incidentally during work-up for thyroid nodules. Pt denies any symptoms as per surgery team. Pt freceived left carotid endarterectomy with electroencephalogram neuromonitoring on 3/28/2019. Patient was sent to ICU for post op monitoring. Estimated blood loss of 100ml and patient received 1L IV resuscitation during procedure. Pt intubated and extubated for surgery. Patient denied pain in the surgical site, headache, dizziness, visual and auditory hallucination, chest pain, palpitation, and any other symptoms.     PAST MEDICAL & SURGICAL HISTORY:  PVD (peripheral vascular disease)  Multiple thyroid nodules  DM (diabetes mellitus)  Hypertension  Carotid artery stenosis  Status post biopsy of thyroid gland: FNA of thyroid nodules  History of left breast biopsy    FAMILY HISTORY:  FHx: diabetes mellitus    SOCIAL HISTORY: Denies smoking, ETOH use or drug use    REVIEW OF SYSTEMS:    CONSTITUTIONAL: No weakness, fevers or chills  EYES/ENT: No visual changes;  No vertigo or throat pain   NECK: No pain or stiffness  RESPIRATORY: No cough, wheezing, hemoptysis; No shortness of breath  CARDIOVASCULAR: No chest pain or palpitations  GASTROINTESTINAL: No abdominal or epigastric pain. No nausea, vomiting, or hematemesis; No diarrhea or constipation. No melena or hematochezia.  GENITOURINARY: No dysuria, frequency or hematuria  NEUROLOGICAL: No numbness or weakness  SKIN: No itching, rashes  No other complaint except mentioned as above.     PHYSICAL EXAM:    GENERAL: NAD, well-developed    HEAD:  Atraumatic, Normocephalic    EYES: EOMI, PERRLA, conjunctiva and sclera clear    NECK: Supple, No JVD, surgical site clean, no evident of bleeding, drain in place and empty    CHEST/LUNG: Clear to auscultation bilaterally; No wheeze    HEART: Regular rate and rhythm; No murmurs, rubs, or gallops    ABDOMEN: Soft, Nontender, Nondistended; Bowel sounds present    EXTREMITIES:  2+ Peripheral Pulses, No clubbing, cyanosis, or edema    PSYCH: AAOx3    NEUROLOGY: non-focal, bilateral upper and lower extremity strenght 5/5. CN II - XII grossly intact    SKIN: No rashes or lesions    No other pertinent positive finding except mentioned as above.           PAST MEDICAL & SURGICAL HISTORY:  PVD (peripheral vascular disease)  Multiple thyroid nodules  DM (diabetes mellitus)  Hypertension  Carotid artery stenosis  Status post biopsy of thyroid gland: FNA of thyroid nodules  History of left breast biopsy      No Known Allergies      Meds:  acetaminophen  IVPB .. 1000 milliGRAM(s) IV Intermittent once  aspirin enteric coated 81 milliGRAM(s) Oral daily  ATENolol  Tablet 50 milliGRAM(s) Oral daily  chlorhexidine 2% Cloths 1 Application(s) Topical daily  HYDROmorphone  Injectable 0.5 milliGRAM(s) IV Push every 10 minutes PRN  insulin lispro (HumaLOG) corrective regimen sliding scale   SubCutaneous three times a day before meals  lactated ringers. 1000 milliLiter(s) IV Continuous <Continuous>  losartan 100 milliGRAM(s) Oral daily  NIFEdipine XL 90 milliGRAM(s) Oral at bedtime  ondansetron Injectable 4 milliGRAM(s) IV Push once PRN  protamine Injectable 50 milliGRAM(s) IV Push once  simvastatin 40 milliGRAM(s) Oral at bedtime  sodium chloride 0.9% lock flush 3 milliLiter(s) IV Push every 8 hours      SOCIAL HISTORY:  Smoker:  YES / NO        PACK YEARS:                         WHEN QUIT?  ETOH use:  YES / NO               FREQUENCY / QUANTITY:  Ilicit Drug use:  YES / NO  Occupation:  Assisted device use (Cane / Walker):  Live with:    FAMILY HISTORY:  FHx: diabetes mellitus      VITALS:  Vital Signs Last 24 Hrs  T(C): 36.6 (28 Mar 2019 10:54), Max: 36.7 (28 Mar 2019 07:45)  T(F): 97.9 (28 Mar 2019 10:54), Max: 98 (28 Mar 2019 07:45)  HR: 74 (28 Mar 2019 12:10) (49 - 74)  BP: 137/56 (28 Mar 2019 12:10) (114/50 - 141/56)  BP(mean): --  RR: 18 (28 Mar 2019 12:10) (12 - 21)  SpO2: 100% (28 Mar 2019 12:10) (97% - 100%)    LABS/DIAGNOSTIC TESTS:            Mg     1.8     03-28

## 2019-03-28 NOTE — ASU PREOP CHECKLIST - SELECT TESTS ORDERED
POCT Blood Glucose/Results in MD note/Type and Screen/PT/PTT/Type and Cross/fingerstick 91/INR/EKG/CXR/BMP/CBC

## 2019-03-28 NOTE — PROGRESS NOTE ADULT - ASSESSMENT
73y old Female s/p  Left Carotid endarterectomy POD#0 with PMH PVD, DM, Hypertension    - local wound care  - continue TIARA drain and monitor output  - keep SBP between 110-160  - DVT prophylaxis, Incentive Spirometer, OOB, Ambulating, pain control  - continue current management per ICU

## 2019-03-29 LAB
ALBUMIN SERPL ELPH-MCNC: 3.2 G/DL — LOW (ref 3.5–5)
ALP SERPL-CCNC: 71 U/L — SIGNIFICANT CHANGE UP (ref 40–120)
ALT FLD-CCNC: 14 U/L DA — SIGNIFICANT CHANGE UP (ref 10–60)
ANION GAP SERPL CALC-SCNC: 7 MMOL/L — SIGNIFICANT CHANGE UP (ref 5–17)
AST SERPL-CCNC: 16 U/L — SIGNIFICANT CHANGE UP (ref 10–40)
BASOPHILS # BLD AUTO: 0.01 K/UL — SIGNIFICANT CHANGE UP (ref 0–0.2)
BASOPHILS NFR BLD AUTO: 0.1 % — SIGNIFICANT CHANGE UP (ref 0–2)
BILIRUB SERPL-MCNC: 0.5 MG/DL — SIGNIFICANT CHANGE UP (ref 0.2–1.2)
BUN SERPL-MCNC: 22 MG/DL — HIGH (ref 7–18)
CALCIUM SERPL-MCNC: 8.4 MG/DL — SIGNIFICANT CHANGE UP (ref 8.4–10.5)
CHLORIDE SERPL-SCNC: 109 MMOL/L — HIGH (ref 96–108)
CO2 SERPL-SCNC: 24 MMOL/L — SIGNIFICANT CHANGE UP (ref 22–31)
CREAT SERPL-MCNC: 1.12 MG/DL — SIGNIFICANT CHANGE UP (ref 0.5–1.3)
EOSINOPHIL # BLD AUTO: 0.01 K/UL — SIGNIFICANT CHANGE UP (ref 0–0.5)
EOSINOPHIL NFR BLD AUTO: 0.1 % — SIGNIFICANT CHANGE UP (ref 0–6)
GLUCOSE SERPL-MCNC: 86 MG/DL — SIGNIFICANT CHANGE UP (ref 70–99)
HCT VFR BLD CALC: 38 % — SIGNIFICANT CHANGE UP (ref 34.5–45)
HGB BLD-MCNC: 12.2 G/DL — SIGNIFICANT CHANGE UP (ref 11.5–15.5)
IMM GRANULOCYTES NFR BLD AUTO: 0.6 % — SIGNIFICANT CHANGE UP (ref 0–1.5)
LYMPHOCYTES # BLD AUTO: 1.68 K/UL — SIGNIFICANT CHANGE UP (ref 1–3.3)
LYMPHOCYTES # BLD AUTO: 12.8 % — LOW (ref 13–44)
MAGNESIUM SERPL-MCNC: 2.1 MG/DL — SIGNIFICANT CHANGE UP (ref 1.6–2.6)
MCHC RBC-ENTMCNC: 29.5 PG — SIGNIFICANT CHANGE UP (ref 27–34)
MCHC RBC-ENTMCNC: 32.1 GM/DL — SIGNIFICANT CHANGE UP (ref 32–36)
MCV RBC AUTO: 91.8 FL — SIGNIFICANT CHANGE UP (ref 80–100)
MONOCYTES # BLD AUTO: 0.99 K/UL — HIGH (ref 0–0.9)
MONOCYTES NFR BLD AUTO: 7.5 % — SIGNIFICANT CHANGE UP (ref 2–14)
NEUTROPHILS # BLD AUTO: 10.36 K/UL — HIGH (ref 1.8–7.4)
NEUTROPHILS NFR BLD AUTO: 78.9 % — HIGH (ref 43–77)
NRBC # BLD: 0 /100 WBCS — SIGNIFICANT CHANGE UP (ref 0–0)
PHOSPHATE SERPL-MCNC: 2.8 MG/DL — SIGNIFICANT CHANGE UP (ref 2.5–4.5)
PLATELET # BLD AUTO: 154 K/UL — SIGNIFICANT CHANGE UP (ref 150–400)
POTASSIUM SERPL-MCNC: 4.1 MMOL/L — SIGNIFICANT CHANGE UP (ref 3.5–5.3)
POTASSIUM SERPL-SCNC: 4.1 MMOL/L — SIGNIFICANT CHANGE UP (ref 3.5–5.3)
PROT SERPL-MCNC: 6.8 G/DL — SIGNIFICANT CHANGE UP (ref 6–8.3)
RBC # BLD: 4.14 M/UL — SIGNIFICANT CHANGE UP (ref 3.8–5.2)
RBC # FLD: 13.1 % — SIGNIFICANT CHANGE UP (ref 10.3–14.5)
SODIUM SERPL-SCNC: 140 MMOL/L — SIGNIFICANT CHANGE UP (ref 135–145)
WBC # BLD: 13.13 K/UL — HIGH (ref 3.8–10.5)
WBC # FLD AUTO: 13.13 K/UL — HIGH (ref 3.8–10.5)

## 2019-03-29 RX ORDER — HYDRALAZINE HCL 50 MG
25 TABLET ORAL EVERY 8 HOURS
Qty: 0 | Refills: 0 | Status: DISCONTINUED | OUTPATIENT
Start: 2019-03-29 | End: 2019-03-30

## 2019-03-29 RX ORDER — HEPARIN SODIUM 5000 [USP'U]/ML
5000 INJECTION INTRAVENOUS; SUBCUTANEOUS EVERY 8 HOURS
Qty: 0 | Refills: 0 | Status: DISCONTINUED | OUTPATIENT
Start: 2019-03-29 | End: 2019-04-02

## 2019-03-29 RX ADMIN — SODIUM CHLORIDE 3 MILLILITER(S): 9 INJECTION INTRAMUSCULAR; INTRAVENOUS; SUBCUTANEOUS at 21:06

## 2019-03-29 RX ADMIN — HEPARIN SODIUM 5000 UNIT(S): 5000 INJECTION INTRAVENOUS; SUBCUTANEOUS at 13:29

## 2019-03-29 RX ADMIN — ATENOLOL 50 MILLIGRAM(S): 25 TABLET ORAL at 05:10

## 2019-03-29 RX ADMIN — CHLORHEXIDINE GLUCONATE 1 APPLICATION(S): 213 SOLUTION TOPICAL at 05:10

## 2019-03-29 RX ADMIN — NICARDIPINE HYDROCHLORIDE 25 MG/HR: 30 CAPSULE, EXTENDED RELEASE ORAL at 17:34

## 2019-03-29 RX ADMIN — LOSARTAN POTASSIUM 100 MILLIGRAM(S): 100 TABLET, FILM COATED ORAL at 05:10

## 2019-03-29 RX ADMIN — SIMVASTATIN 40 MILLIGRAM(S): 20 TABLET, FILM COATED ORAL at 21:06

## 2019-03-29 RX ADMIN — SODIUM CHLORIDE 3 MILLILITER(S): 9 INJECTION INTRAMUSCULAR; INTRAVENOUS; SUBCUTANEOUS at 08:21

## 2019-03-29 RX ADMIN — SODIUM CHLORIDE 3 MILLILITER(S): 9 INJECTION INTRAMUSCULAR; INTRAVENOUS; SUBCUTANEOUS at 05:11

## 2019-03-29 RX ADMIN — Medication 25 MILLIGRAM(S): at 13:30

## 2019-03-29 RX ADMIN — Medication 25 MILLIGRAM(S): at 21:06

## 2019-03-29 RX ADMIN — HEPARIN SODIUM 5000 UNIT(S): 5000 INJECTION INTRAVENOUS; SUBCUTANEOUS at 21:06

## 2019-03-29 RX ADMIN — Medication 81 MILLIGRAM(S): at 10:37

## 2019-03-29 RX ADMIN — Medication 25 MILLIGRAM(S): at 10:40

## 2019-03-29 RX ADMIN — CHLORHEXIDINE GLUCONATE 1 APPLICATION(S): 213 SOLUTION TOPICAL at 13:28

## 2019-03-29 NOTE — PROGRESS NOTE ADULT - ASSESSMENT
73 F with HTN DM HLD PVD admitted to ICU for post op monitoring after left carotid endarterectomy on 3/28.     1)Post op monitoring  Neuro check and pulse check Q2 hour  BP goal SBP between 100-150  was uncontrolled on oral meds and hence started on nicardipine drip   patient on aspirin and atorvastatin  pt already received protamine sulphate in PACU as per surgical PA  Restarted asa, hep sc    2)Hypertensive urgency   was uncontrolled on oral meds and hence started on nicardipine drip   will monitor BP closely  BP goal SBP between 100-150    3)DM   on metformin at home  will keep on HSS and accu check   A1c : 6    4)HLD and PVD  atorvastatin and aspirin    5)DVt PPx with hep sc and gi ppx with pepcid

## 2019-03-29 NOTE — PHYSICAL THERAPY INITIAL EVALUATION ADULT - DIAGNOSIS, PT EVAL
Immobility for 2 days; general weakness, increased trunk sway with ambulation secondary to s/p L Carotid endarterectomy POD#1

## 2019-03-29 NOTE — PROGRESS NOTE ADULT - SUBJECTIVE AND OBJECTIVE BOX
INTERVAL HPI/OVERNIGHT EVENTS:    S/P Left Carotid endarterectomy POD#1, pt seen and examined at bedside. Offers no acute complaints at this time, admits to min incisional pain. Denies nausea/ vomiting, no headaches and dizziness, no numbness or tingling sensation, no difficulty swallowing. Denies fever, chills, SOB or CP.      Vital Signs Last 24 Hrs  T(C): 37.1 (28 Mar 2019 23:30), Max: 37.1 (28 Mar 2019 23:30)  T(F): 98.7 (28 Mar 2019 23:30), Max: 98.7 (28 Mar 2019 23:30)  HR: 60 (29 Mar 2019 08:00) (57 - 80)  BP: 129/46 (29 Mar 2019 07:00) (113/40 - 181/136)  BP(mean): 67 (29 Mar 2019 07:00) (58 - 145)  RR: 18 (29 Mar 2019 08:00) (12 - 23)  SpO2: 93% (29 Mar 2019 08:00) (93% - 100%)  I&O's Detail    28 Mar 2019 07:01  -  29 Mar 2019 07:00  --------------------------------------------------------  IN:    IV PiggyBack: 100 mL    lactated ringers.: 425 mL    niCARdipine Infusion: 212.5 mL    Oral Fluid: 640 mL  Total IN: 1377.5 mL    OUT:    Bulb: 25 mL    Voided: 1800 mL  Total OUT: 1825 mL    Total NET: -447.5 mL      29 Mar 2019 07:01  -  29 Mar 2019 08:29  --------------------------------------------------------  IN:    niCARdipine Infusion: 15 mL    Oral Fluid: 100 mL  Total IN: 115 mL    OUT:  Total OUT: 0 mL    Total NET: 115 mL        Physical Exam  General: AAOx3, No acute distress  Skin: No jaundice, no icterus  Neck: left neck, dressing c/d/i, no TTP, TIARA in place, ss output    Extremities: non edematous, motor and sensory intact       Labs:                        12.2   13.13 )-----------( 154      ( 29 Mar 2019 06:39 )             38.0     03-29    140  |  109<H>  |  22<H>  ----------------------------<  86  4.1   |  24  |  1.12    Ca    8.4      29 Mar 2019 06:39  Phos  2.8     03-29  Mg     2.1     03-29    TPro  6.8  /  Alb  3.2<L>  /  TBili  0.5  /  DBili  x   /  AST  16  /  ALT  14  /  AlkPhos  71  03-29    PT/INR - ( 28 Mar 2019 14:02 )   PT: 11.3 sec;   INR: 1.02 ratio

## 2019-03-29 NOTE — PHYSICAL THERAPY INITIAL EVALUATION ADULT - GENERAL OBSERVATIONS, REHAB EVAL
Pt seen bedside, HOB raised, NAD, A&Ox3, +R A-line, +2 foam boots, +L TIARA drain at neck, +pulse ox, +telemetry

## 2019-03-29 NOTE — PHYSICAL THERAPY INITIAL EVALUATION ADULT - ADDITIONAL COMMENTS
Pt lives in a house with 2 floors with 15 steps inside house. She is independent prior with no AD and performs ADL's most of the time independently with some assist from grandson/granddaughter.

## 2019-03-29 NOTE — PROGRESS NOTE ADULT - ASSESSMENT
72 y/o Female s/p Left Carotid endarterectomy POD#1    -D/c TIARA drain today   -BP control   -Wean off Nicardipine gtt   -OOB/ambulate   -DVT ppx   -Incentive spirometry   -ICU downgrade

## 2019-03-29 NOTE — PROGRESS NOTE ADULT - SUBJECTIVE AND OBJECTIVE BOX
INTERVAL HPI/OVERNIGHT EVENTS:       Antimicrobial:    Cardiovascular:  ATENolol  Tablet 50 milliGRAM(s) Oral daily  hydrALAZINE 25 milliGRAM(s) Oral every 8 hours  losartan 100 milliGRAM(s) Oral daily  niCARdipine Infusion 5 mG/Hr IV Continuous <Continuous>    Pulmonary:    Hematalogic:  aspirin enteric coated 81 milliGRAM(s) Oral daily  heparin  Injectable 5000 Unit(s) SubCutaneous every 8 hours    Other:  chlorhexidine 4% Liquid 1 Application(s) Topical every 12 hours  insulin lispro (HumaLOG) corrective regimen sliding scale   SubCutaneous three times a day before meals  simvastatin 40 milliGRAM(s) Oral at bedtime  sodium chloride 0.9% lock flush 3 milliLiter(s) IV Push every 8 hours      Drug Dosing Weight  Height (cm): 157.48 (28 Mar 2019 12:29)  Weight (kg): 77.1 (28 Mar 2019 12:29)  BMI (kg/m2): 31.1 (28 Mar 2019 12:29)  BSA (m2): 1.78 (28 Mar 2019 12:29)    CENTRAL LINE: [ ] YES [ ] NO  LOCATION:   DATE INSERTED:    [place]: [ ] YES [ ] NO    DATE INSERTED:    A-LINE:  [ ] YES [ ] NO  LOCATION:   DATE INSERTED:    PMH/Social Hx/Fam Hx -reviewed admission note, no change since admission  PAST MEDICAL & SURGICAL HISTORY:  PVD (peripheral vascular disease)  Multiple thyroid nodules  DM (diabetes mellitus)  Hypertension  Carotid artery stenosis  Status post biopsy of thyroid gland: FNA of thyroid nodules  History of left breast biopsy      T(C): 36.6 (03-29-19 @ 15:33), Max: 37.1 (03-28-19 @ 23:30)  HR: 70 (03-29-19 @ 14:30)  BP: 135/51 (03-29-19 @ 11:00)  BP(mean): 71 (03-29-19 @ 11:00)  ABP: 134/47 (03-29-19 @ 14:30)  ABP(mean): -4 (03-29-19 @ 14:30)  RR: 22 (03-29-19 @ 14:30)  SpO2: 97% (03-29-19 @ 14:30)  Wt(kg): --          03-28 @ 07:01  -  03-29 @ 07:00  --------------------------------------------------------  IN: 1377.5 mL / OUT: 1825 mL / NET: -447.5 mL            PHYSICAL EXAM:    GENERAL: No signs of distress, comfortable  HEAD: Atraumatic, Normocephalic  EYES: EOMI, PERRLA  ENMT: No erythema, exudates, or enlargement, Moist mucous membranes  NECK: Supple, normal appearance, left neck with dressing .. clean  CHEST/LUNG: No chest deformity, fair bilateral air entry; No rales, rhonchi, wheezing; crackles  HEART: Regular rate and rhythm; No murmurs, rubs, or gallops;   ABDOMEN: Soft, Nontender, Nondistended; Bowel sounds present  EXTREMITIES:  + Peripheral Pulses, No clubbing, cyanosis, or edema  NERVOUS SYSTEM: awake and alert x 3, follows commands, upper and lower extremities  LYMPH: No lymphadenopathy noted  SKIN: No rashes or lesions; good turgor, warm, dry      LABS:  CBC Full  -  ( 29 Mar 2019 06:39 )  WBC Count : 13.13 K/uL  RBC Count : 4.14 M/uL  Hemoglobin : 12.2 g/dL  Hematocrit : 38.0 %  Platelet Count - Automated : 154 K/uL  Mean Cell Volume : 91.8 fl  Mean Cell Hemoglobin : 29.5 pg  Mean Cell Hemoglobin Concentration : 32.1 gm/dL  Auto Neutrophil # : 10.36 K/uL  Auto Lymphocyte # : 1.68 K/uL  Auto Monocyte # : 0.99 K/uL  Auto Eosinophil # : 0.01 K/uL  Auto Basophil # : 0.01 K/uL  Auto Neutrophil % : 78.9 %  Auto Lymphocyte % : 12.8 %  Auto Monocyte % : 7.5 %  Auto Eosinophil % : 0.1 %  Auto Basophil % : 0.1 %    03-29    140  |  109<H>  |  22<H>  ----------------------------<  86  4.1   |  24  |  1.12    Ca    8.4      29 Mar 2019 06:39  Phos  2.8     03-29  Mg     2.1     03-29    TPro  6.8  /  Alb  3.2<L>  /  TBili  0.5  /  DBili  x   /  AST  16  /  ALT  14  /  AlkPhos  71  03-29    PT/INR - ( 28 Mar 2019 14:02 )   PT: 11.3 sec;   INR: 1.02 ratio                 RADIOLOGY & ADDITIONAL STUDIES REVIEWED         IMPRESSION:  PAST MEDICAL & SURGICAL HISTORY:  PVD (peripheral vascular disease)  Multiple thyroid nodules  DM (diabetes mellitus)  Hypertension  Carotid artery stenosis  Status post biopsy of thyroid gland: FNA of thyroid nodules  History of left breast biopsy   p/w       Assessment and Plan:   · Assessment		  73 F with HTN DM HLD PVD admitted to ICU for post op monitoring after left carotid endarterectomy on 3/28.     1)Post op monitoring  Neuro check and pulse check Q2 hour  BP goal SBP between 100-150  was uncontrolled on oral meds and hence started on nicardipine drip   patient on aspirin and atorvastatin  pt already received protamine sulphate in PACU as per surgical PA  Restarted asa, hep sc    2)Hypertensive urgency   was uncontrolled on oral meds and hence started on nicardipine drip   will monitor BP closely  BP goal SBP between 100-150    3)DM   on metformin at home  will keep on HSS and accu check   A1c : 6    4)HLD and PVD  atorvastatin and aspirin    5)DVt PPx with hep sc and gi ppx with pepcid           GOALS OF CARE DISCUSSION WITH PATIENT/FAMILY/PROXY

## 2019-03-29 NOTE — PROGRESS NOTE ADULT - SUBJECTIVE AND OBJECTIVE BOX
INTERVAL HPI/ OVERNIGHT EVENTS: Was complaining of tingling in Left arm , thumb , but pulses intact, no discoloration or change in warmth     PRESSORS: [ ] YES [x ] NO  WHICH:    Antimicrobial:    Cardiovascular:  ATENolol  Tablet 50 milliGRAM(s) Oral daily  losartan 100 milliGRAM(s) Oral daily  niCARdipine Infusion 5 mG/Hr IV Continuous <Continuous>    Pulmonary:    Hematalogic:  aspirin enteric coated 81 milliGRAM(s) Oral daily  heparin  Injectable 5000 Unit(s) SubCutaneous every 8 hours    Other:  chlorhexidine 4% Liquid 1 Application(s) Topical every 12 hours  insulin lispro (HumaLOG) corrective regimen sliding scale   SubCutaneous three times a day before meals  simvastatin 40 milliGRAM(s) Oral at bedtime  sodium chloride 0.9% lock flush 3 milliLiter(s) IV Push every 8 hours    aspirin enteric coated 81 milliGRAM(s) Oral daily  ATENolol  Tablet 50 milliGRAM(s) Oral daily  chlorhexidine 4% Liquid 1 Application(s) Topical every 12 hours  heparin  Injectable 5000 Unit(s) SubCutaneous every 8 hours  insulin lispro (HumaLOG) corrective regimen sliding scale   SubCutaneous three times a day before meals  losartan 100 milliGRAM(s) Oral daily  niCARdipine Infusion 5 mG/Hr IV Continuous <Continuous>  simvastatin 40 milliGRAM(s) Oral at bedtime  sodium chloride 0.9% lock flush 3 milliLiter(s) IV Push every 8 hours    Drug Dosing Weight  Height (cm): 157.48 (28 Mar 2019 12:29)  Weight (kg): 77.1 (28 Mar 2019 12:29)  BMI (kg/m2): 31.1 (28 Mar 2019 12:29)  BSA (m2): 1.78 (28 Mar 2019 12:29)    CENTRAL LINE: [ ] YES [ x] NO  LOCATION:   DATE INSERTED:  REMOVE: [ ] YES [ ] NO  EXPLAIN:    [place]: [ ] YES [ x] NO    DATE INSERTED:  REMOVE:  [ ] YES [ ] NO  EXPLAIN:    A-LINE:  [x ] YES [ ] NO  LOCATION:  Left radial A DATE INSERTED: 3/28  REMOVE:  [ ] YES [ ] NO  EXPLAIN:    PMH -reviewed admission note, no change since admission      ICU Vital Signs Last 24 Hrs  T(C): 37.1 (28 Mar 2019 23:30), Max: 37.1 (28 Mar 2019 23:30)  T(F): 98.7 (28 Mar 2019 23:30), Max: 98.7 (28 Mar 2019 23:30)  HR: 65 (29 Mar 2019 08:30) (57 - 80)  BP: 129/46 (29 Mar 2019 07:00) (113/40 - 181/136)  BP(mean): 67 (29 Mar 2019 07:00) (58 - 145)  ABP: 150/51 (29 Mar 2019 08:30) (106/34 - 196/79)  ABP(mean): 84 (29 Mar 2019 08:30) (56 - 128)  RR: 19 (29 Mar 2019 08:30) (12 - 23)  SpO2: 94% (29 Mar 2019 08:30) (93% - 100%)            03-28 @ 07:01  -  03-29 @ 07:00  --------------------------------------------------------  IN: 1377.5 mL / OUT: 1825 mL / NET: -447.5 mL            PHYSICAL EXAM:    GENERAL: [x ]NAD, [x ]well-groomed, [ x]well-developed  HEAD:  [x ]Atraumatic, [x ]Normocephalic  EYES: [ x]EOMI, [x ]PERRLA, [ x]conjunctiva and sclera clear  ENMT: [x ]No tonsillar erythema, exudates, or enlargement; [x ]Moist mucous membranes  NECK: [x ]Supple, normal appearance,   NERVOUS SYSTEM:  [x ]Alert & Oriented X3; [ x]Motor Strength 5/5 B/L upper and lower extremities; [ x]DTRs 2+ intact and symmetric  CHEST/LUNG: [x ]No chest deformity; [x ]Normal percussion bilaterally; [ x]No rales, rhonchi, wheezing   TIARA drain + , no output , clean dressing +  HEART: [ x]Regular rate and rhythm; [x ]No murmurs, rubs, or gallops  ABDOMEN: [x ]Soft, Nontender, Nondistended; [x ]Bowel sounds present  EXTREMITIES:  [x ]2+ Peripheral Pulses, [x ]No clubbing, cyanosis, or edema  LYMPH: [ x]No lymphadenopathy noted  SKIN: [x ]No rashes or lesions; [ x]Good capillary refill      LABS:  CBC Full  -  ( 29 Mar 2019 06:39 )  WBC Count : 13.13 K/uL  RBC Count : 4.14 M/uL  Hemoglobin : 12.2 g/dL  Hematocrit : 38.0 %  Platelet Count - Automated : 154 K/uL  Mean Cell Volume : 91.8 fl  Mean Cell Hemoglobin : 29.5 pg  Mean Cell Hemoglobin Concentration : 32.1 gm/dL  Auto Neutrophil # : 10.36 K/uL  Auto Lymphocyte # : 1.68 K/uL  Auto Monocyte # : 0.99 K/uL  Auto Eosinophil # : 0.01 K/uL  Auto Basophil # : 0.01 K/uL  Auto Neutrophil % : 78.9 %  Auto Lymphocyte % : 12.8 %  Auto Monocyte % : 7.5 %  Auto Eosinophil % : 0.1 %  Auto Basophil % : 0.1 %    03-29    140  |  109<H>  |  22<H>  ----------------------------<  86  4.1   |  24  |  1.12    Ca    8.4      29 Mar 2019 06:39  Phos  2.8     03-29  Mg     2.1     03-29    TPro  6.8  /  Alb  3.2<L>  /  TBili  0.5  /  DBili  x   /  AST  16  /  ALT  14  /  AlkPhos  71  03-29    PT/INR - ( 28 Mar 2019 14:02 )   PT: 11.3 sec;   INR: 1.02 ratio                 RADIOLOGY & ADDITIONAL STUDIES REVIEWED:  ***    [ ]GOALS OF CARE DISCUSSION WITH PATIENT/FAMILY/PROXY:    CRITICAL CARE TIME SPENT: 35 minutes

## 2019-03-30 DIAGNOSIS — I65.29 OCCLUSION AND STENOSIS OF UNSPECIFIED CAROTID ARTERY: ICD-10-CM

## 2019-03-30 LAB
ALBUMIN SERPL ELPH-MCNC: 2.7 G/DL — LOW (ref 3.5–5)
ALP SERPL-CCNC: 61 U/L — SIGNIFICANT CHANGE UP (ref 40–120)
ALT FLD-CCNC: 12 U/L DA — SIGNIFICANT CHANGE UP (ref 10–60)
ANION GAP SERPL CALC-SCNC: 7 MMOL/L — SIGNIFICANT CHANGE UP (ref 5–17)
AST SERPL-CCNC: 12 U/L — SIGNIFICANT CHANGE UP (ref 10–40)
BASOPHILS # BLD AUTO: 0.03 K/UL — SIGNIFICANT CHANGE UP (ref 0–0.2)
BASOPHILS NFR BLD AUTO: 0.3 % — SIGNIFICANT CHANGE UP (ref 0–2)
BILIRUB SERPL-MCNC: 0.5 MG/DL — SIGNIFICANT CHANGE UP (ref 0.2–1.2)
BUN SERPL-MCNC: 23 MG/DL — HIGH (ref 7–18)
CALCIUM SERPL-MCNC: 7.3 MG/DL — LOW (ref 8.4–10.5)
CHLORIDE SERPL-SCNC: 112 MMOL/L — HIGH (ref 96–108)
CK MB BLD-MCNC: <1.1 % — SIGNIFICANT CHANGE UP (ref 0–3.5)
CK MB CFR SERPL CALC: <1 NG/ML — SIGNIFICANT CHANGE UP (ref 0–3.6)
CK SERPL-CCNC: 92 U/L — SIGNIFICANT CHANGE UP (ref 21–215)
CO2 SERPL-SCNC: 23 MMOL/L — SIGNIFICANT CHANGE UP (ref 22–31)
CREAT SERPL-MCNC: 0.89 MG/DL — SIGNIFICANT CHANGE UP (ref 0.5–1.3)
EOSINOPHIL # BLD AUTO: 0.04 K/UL — SIGNIFICANT CHANGE UP (ref 0–0.5)
EOSINOPHIL NFR BLD AUTO: 0.4 % — SIGNIFICANT CHANGE UP (ref 0–6)
GLUCOSE SERPL-MCNC: 88 MG/DL — SIGNIFICANT CHANGE UP (ref 70–99)
HCT VFR BLD CALC: 35.1 % — SIGNIFICANT CHANGE UP (ref 34.5–45)
HGB BLD-MCNC: 11.1 G/DL — LOW (ref 11.5–15.5)
IMM GRANULOCYTES NFR BLD AUTO: 0.7 % — SIGNIFICANT CHANGE UP (ref 0–1.5)
LYMPHOCYTES # BLD AUTO: 2.03 K/UL — SIGNIFICANT CHANGE UP (ref 1–3.3)
LYMPHOCYTES # BLD AUTO: 20.7 % — SIGNIFICANT CHANGE UP (ref 13–44)
MAGNESIUM SERPL-MCNC: 1.8 MG/DL — SIGNIFICANT CHANGE UP (ref 1.6–2.6)
MCHC RBC-ENTMCNC: 29.3 PG — SIGNIFICANT CHANGE UP (ref 27–34)
MCHC RBC-ENTMCNC: 31.6 GM/DL — LOW (ref 32–36)
MCV RBC AUTO: 92.6 FL — SIGNIFICANT CHANGE UP (ref 80–100)
MONOCYTES # BLD AUTO: 0.89 K/UL — SIGNIFICANT CHANGE UP (ref 0–0.9)
MONOCYTES NFR BLD AUTO: 9.1 % — SIGNIFICANT CHANGE UP (ref 2–14)
NEUTROPHILS # BLD AUTO: 6.75 K/UL — SIGNIFICANT CHANGE UP (ref 1.8–7.4)
NEUTROPHILS NFR BLD AUTO: 68.8 % — SIGNIFICANT CHANGE UP (ref 43–77)
NRBC # BLD: 0 /100 WBCS — SIGNIFICANT CHANGE UP (ref 0–0)
PHOSPHATE SERPL-MCNC: 1.9 MG/DL — LOW (ref 2.5–4.5)
PLATELET # BLD AUTO: 192 K/UL — SIGNIFICANT CHANGE UP (ref 150–400)
POTASSIUM SERPL-MCNC: 3.6 MMOL/L — SIGNIFICANT CHANGE UP (ref 3.5–5.3)
POTASSIUM SERPL-SCNC: 3.6 MMOL/L — SIGNIFICANT CHANGE UP (ref 3.5–5.3)
PROT SERPL-MCNC: 6.2 G/DL — SIGNIFICANT CHANGE UP (ref 6–8.3)
RBC # BLD: 3.79 M/UL — LOW (ref 3.8–5.2)
RBC # FLD: 13.3 % — SIGNIFICANT CHANGE UP (ref 10.3–14.5)
SODIUM SERPL-SCNC: 142 MMOL/L — SIGNIFICANT CHANGE UP (ref 135–145)
TROPONIN I SERPL-MCNC: <0.015 NG/ML — SIGNIFICANT CHANGE UP (ref 0–0.04)
WBC # BLD: 9.81 K/UL — SIGNIFICANT CHANGE UP (ref 3.8–10.5)
WBC # FLD AUTO: 9.81 K/UL — SIGNIFICANT CHANGE UP (ref 3.8–10.5)

## 2019-03-30 RX ORDER — DOCUSATE SODIUM 100 MG
100 CAPSULE ORAL
Qty: 0 | Refills: 0 | Status: DISCONTINUED | OUTPATIENT
Start: 2019-03-30 | End: 2019-04-02

## 2019-03-30 RX ORDER — ATENOLOL 25 MG/1
25 TABLET ORAL DAILY
Qty: 0 | Refills: 0 | Status: DISCONTINUED | OUTPATIENT
Start: 2019-03-30 | End: 2019-03-31

## 2019-03-30 RX ORDER — PHENYLEPHRINE HYDROCHLORIDE 10 MG/ML
0.5 INJECTION INTRAVENOUS
Qty: 160 | Refills: 0 | Status: DISCONTINUED | OUTPATIENT
Start: 2019-03-30 | End: 2019-04-01

## 2019-03-30 RX ORDER — SENNA PLUS 8.6 MG/1
2 TABLET ORAL AT BEDTIME
Qty: 0 | Refills: 0 | Status: DISCONTINUED | OUTPATIENT
Start: 2019-03-30 | End: 2019-04-02

## 2019-03-30 RX ORDER — SODIUM CHLORIDE 9 MG/ML
1000 INJECTION INTRAMUSCULAR; INTRAVENOUS; SUBCUTANEOUS ONCE
Qty: 0 | Refills: 0 | Status: COMPLETED | OUTPATIENT
Start: 2019-03-30 | End: 2019-03-30

## 2019-03-30 RX ORDER — LOSARTAN POTASSIUM 100 MG/1
75 TABLET, FILM COATED ORAL DAILY
Qty: 0 | Refills: 0 | Status: DISCONTINUED | OUTPATIENT
Start: 2019-03-30 | End: 2019-03-31

## 2019-03-30 RX ORDER — NIFEDIPINE 30 MG
60 TABLET, EXTENDED RELEASE 24 HR ORAL DAILY
Qty: 0 | Refills: 0 | Status: DISCONTINUED | OUTPATIENT
Start: 2019-03-30 | End: 2019-03-31

## 2019-03-30 RX ORDER — SODIUM CHLORIDE 9 MG/ML
500 INJECTION INTRAMUSCULAR; INTRAVENOUS; SUBCUTANEOUS ONCE
Qty: 0 | Refills: 0 | Status: DISCONTINUED | OUTPATIENT
Start: 2019-03-30 | End: 2019-03-30

## 2019-03-30 RX ORDER — POTASSIUM PHOSPHATE, MONOBASIC POTASSIUM PHOSPHATE, DIBASIC 236; 224 MG/ML; MG/ML
15 INJECTION, SOLUTION INTRAVENOUS ONCE
Qty: 0 | Refills: 0 | Status: COMPLETED | OUTPATIENT
Start: 2019-03-30 | End: 2019-03-30

## 2019-03-30 RX ORDER — PHENYLEPHRINE HYDROCHLORIDE 10 MG/ML
0.5 INJECTION INTRAVENOUS
Qty: 160 | Refills: 0 | Status: DISCONTINUED | OUTPATIENT
Start: 2019-03-30 | End: 2019-03-30

## 2019-03-30 RX ADMIN — ATENOLOL 50 MILLIGRAM(S): 25 TABLET ORAL at 05:44

## 2019-03-30 RX ADMIN — HEPARIN SODIUM 5000 UNIT(S): 5000 INJECTION INTRAVENOUS; SUBCUTANEOUS at 05:45

## 2019-03-30 RX ADMIN — SODIUM CHLORIDE 3 MILLILITER(S): 9 INJECTION INTRAMUSCULAR; INTRAVENOUS; SUBCUTANEOUS at 05:45

## 2019-03-30 RX ADMIN — CHLORHEXIDINE GLUCONATE 1 APPLICATION(S): 213 SOLUTION TOPICAL at 05:45

## 2019-03-30 RX ADMIN — LOSARTAN POTASSIUM 75 MILLIGRAM(S): 100 TABLET, FILM COATED ORAL at 14:25

## 2019-03-30 RX ADMIN — POTASSIUM PHOSPHATE, MONOBASIC POTASSIUM PHOSPHATE, DIBASIC 62.5 MILLIMOLE(S): 236; 224 INJECTION, SOLUTION INTRAVENOUS at 10:00

## 2019-03-30 RX ADMIN — SODIUM CHLORIDE 3 MILLILITER(S): 9 INJECTION INTRAMUSCULAR; INTRAVENOUS; SUBCUTANEOUS at 14:25

## 2019-03-30 RX ADMIN — Medication 81 MILLIGRAM(S): at 13:06

## 2019-03-30 RX ADMIN — SODIUM CHLORIDE 3 MILLILITER(S): 9 INJECTION INTRAMUSCULAR; INTRAVENOUS; SUBCUTANEOUS at 21:06

## 2019-03-30 RX ADMIN — PHENYLEPHRINE HYDROCHLORIDE 7.23 MICROGRAM(S)/KG/MIN: 10 INJECTION INTRAVENOUS at 11:40

## 2019-03-30 RX ADMIN — HEPARIN SODIUM 5000 UNIT(S): 5000 INJECTION INTRAVENOUS; SUBCUTANEOUS at 13:06

## 2019-03-30 RX ADMIN — LOSARTAN POTASSIUM 100 MILLIGRAM(S): 100 TABLET, FILM COATED ORAL at 05:44

## 2019-03-30 RX ADMIN — SIMVASTATIN 40 MILLIGRAM(S): 20 TABLET, FILM COATED ORAL at 21:03

## 2019-03-30 RX ADMIN — Medication 25 MILLIGRAM(S): at 05:44

## 2019-03-30 RX ADMIN — SODIUM CHLORIDE 1000 MILLILITER(S): 9 INJECTION INTRAMUSCULAR; INTRAVENOUS; SUBCUTANEOUS at 10:00

## 2019-03-30 RX ADMIN — HEPARIN SODIUM 5000 UNIT(S): 5000 INJECTION INTRAVENOUS; SUBCUTANEOUS at 21:03

## 2019-03-30 RX ADMIN — CHLORHEXIDINE GLUCONATE 1 APPLICATION(S): 213 SOLUTION TOPICAL at 17:44

## 2019-03-30 RX ADMIN — SENNA PLUS 2 TABLET(S): 8.6 TABLET ORAL at 21:03

## 2019-03-30 RX ADMIN — Medication 100 MILLIGRAM(S): at 21:03

## 2019-03-30 NOTE — DIETITIAN INITIAL EVALUATION ADULT. - PERTINENT LABORATORY DATA
03-30 Na142 mmol/L Glu 88 mg/dL K+ 3.6 mmol/L Cr  0.89 mg/dL BUN 23 mg/dL<H> 03-30 Phos 1.9 mg/dL<L> 03-30 Alb 2.7 g/dL<L> 03-12 [id number] 6.0 %<H> 03-28 Chol 196 mg/dL  mg/dL HDL 69 mg/dL Trig 77 mg/dL

## 2019-03-30 NOTE — PROGRESS NOTE ADULT - SUBJECTIVE AND OBJECTIVE BOX
INTERVAL HPI/OVERNIGHT EVENTS:  Pt resting comfortably. Had 1 episode of HA in AM but has no resolved.  On PO HTN meds. Nicardipine drip d/c'ed.     MEDICATIONS  (STANDING):  aspirin enteric coated 81 milliGRAM(s) Oral daily  ATENolol  Tablet 25 milliGRAM(s) Oral daily  chlorhexidine 4% Liquid 1 Application(s) Topical every 12 hours  heparin  Injectable 5000 Unit(s) SubCutaneous every 8 hours  insulin lispro (HumaLOG) corrective regimen sliding scale   SubCutaneous three times a day before meals  losartan 75 milliGRAM(s) Oral daily  NIFEdipine XL 60 milliGRAM(s) Oral daily  potassium phosphate IVPB 15 milliMole(s) IV Intermittent once  simvastatin 40 milliGRAM(s) Oral at bedtime  sodium chloride 0.9% Bolus 1000 milliLiter(s) IV Bolus once  sodium chloride 0.9% lock flush 3 milliLiter(s) IV Push every 8 hours    Vital Signs Last 24 Hrs  T(C): 36.7 (30 Mar 2019 06:30), Max: 36.7 (29 Mar 2019 21:03)  T(F): 98 (30 Mar 2019 06:30), Max: 98 (29 Mar 2019 21:03)  HR: 56 (30 Mar 2019 11:00) (53 - 81)  BP: 111/48 (30 Mar 2019 11:00) (107/80 - 153/56)  BP(mean): 63 (30 Mar 2019 11:00) (58 - 86)  RR: 20 (30 Mar 2019 11:00) (10 - 24)  SpO2: 97% (30 Mar 2019 11:00) (92% - 97%)    Physical:  General: A&Ox3. NAD.  HEENT: L neck Dressing C/D/I. No surrounding fluctuance or erythema.    I&O's Summary    29 Mar 2019 07:01  -  30 Mar 2019 07:00  --------------------------------------------------------  IN: 1090 mL / OUT: 1400 mL / NET: -310 mL    30 Mar 2019 07:01  -  30 Mar 2019 12:46  --------------------------------------------------------  IN: 0 mL / OUT: 600 mL / NET: -600 mL      LABS:                        11.1   9.81  )-----------( 192      ( 30 Mar 2019 06:31 )             35.1             03-30    142  |  112<H>  |  23<H>  ----------------------------<  88  3.6   |  23  |  0.89    Ca    7.3<L>      30 Mar 2019 06:31  Phos  1.9     03-30  Mg     1.8     03-30    TPro  6.2  /  Alb  2.7<L>  /  TBili  0.5  /  DBili  x   /  AST  12  /  ALT  12  /  AlkPhos  61  03-30

## 2019-03-30 NOTE — PROGRESS NOTE ADULT - SUBJECTIVE AND OBJECTIVE BOX
INTERVAL HPI/OVERNIGHT EVENTS:   No overnight events    PRESSORS: [ ] YES [ x] NO      Antimicrobial:    Cardiovascular:  ATENolol  Tablet 50 milliGRAM(s) Oral daily  hydrALAZINE 25 milliGRAM(s) Oral every 8 hours  losartan 100 milliGRAM(s) Oral daily  niCARdipine Infusion 5 mG/Hr IV Continuous <Continuous>    Pulmonary:    Hematalogic:  aspirin enteric coated 81 milliGRAM(s) Oral daily  heparin  Injectable 5000 Unit(s) SubCutaneous every 8 hours    Other:  chlorhexidine 4% Liquid 1 Application(s) Topical every 12 hours  insulin lispro (HumaLOG) corrective regimen sliding scale   SubCutaneous three times a day before meals  simvastatin 40 milliGRAM(s) Oral at bedtime  sodium chloride 0.9% lock flush 3 milliLiter(s) IV Push every 8 hours      Drug Dosing Weight  Height (cm): 157.48 (28 Mar 2019 12:29)  Weight (kg): 77.1 (28 Mar 2019 12:29)  BMI (kg/m2): 31.1 (28 Mar 2019 12:29)  BSA (m2): 1.78 (28 Mar 2019 12:29)    CENTRAL LINE: [ ] YES [x ] NO  LOCATION:     REMOVE: [ ] YES [ ] NO      [place]: [ ] YES [x ] NO      REMOVE:  [ ] YES [ ] NO      A-LINE:  [x ] YES [ ] NO  LOCATION:     REMOVE:  [ ] YES [ ] NO      PMH/Social Hx/Fam Hx -reviewed admission note, no change since admission  PAST MEDICAL & SURGICAL HISTORY:  PVD (peripheral vascular disease)  Multiple thyroid nodules  DM (diabetes mellitus)  Hypertension  Carotid artery stenosis  Status post biopsy of thyroid gland: FNA of thyroid nodules  History of left breast biopsy        T(C): 36.7 (03-29-19 @ 21:03), Max: 36.7 (03-29-19 @ 21:03)  HR: 62 (03-29-19 @ 22:30)  BP: 121/42 (03-29-19 @ 22:00)  BP(mean): 60 (03-29-19 @ 22:00)  ABP: 130/47 (03-29-19 @ 22:30)  ABP(mean): 72 (03-29-19 @ 22:30)  RR: 22 (03-29-19 @ 22:30)  SpO2: 96% (03-29-19 @ 22:30)  Wt(kg): --          03-28 @ 07:01  -  03-29 @ 07:00  --------------------------------------------------------  IN: 1377.5 mL / OUT: 1825 mL / NET: -447.5 mL            PHYSICAL EXAM:    General - NAD, sitting up in bed, well groomed  Neck - Left sided dressing normal  Lymph Nodes - No lymphadenopathy  Cardiovascular - RRR no m/r/g, no JVD, no carotid bruits  Lungs - Clear to ascultation, no use of accessory muscles, no crackles or wheezes.  Abdomen - Normal bowel sounds, abdomen soft and nontender  Extremities - No edema, cyanosis or clubbing  Musculo Skeletal - 5/5 strength upper and lower extremity.  .  Neurological – Alert and oriented x 3        LABS:  CBC Full  -  ( 29 Mar 2019 06:39 )  WBC Count : 13.13 K/uL  RBC Count : 4.14 M/uL  Hemoglobin : 12.2 g/dL  Hematocrit : 38.0 %  Platelet Count - Automated : 154 K/uL  Mean Cell Volume : 91.8 fl  Mean Cell Hemoglobin : 29.5 pg  Mean Cell Hemoglobin Concentration : 32.1 gm/dL  Auto Neutrophil # : 10.36 K/uL  Auto Lymphocyte # : 1.68 K/uL  Auto Monocyte # : 0.99 K/uL  Auto Eosinophil # : 0.01 K/uL  Auto Basophil # : 0.01 K/uL  Auto Neutrophil % : 78.9 %  Auto Lymphocyte % : 12.8 %  Auto Monocyte % : 7.5 %  Auto Eosinophil % : 0.1 %  Auto Basophil % : 0.1 %    03-29    140  |  109<H>  |  22<H>  ----------------------------<  86  4.1   |  24  |  1.12    Ca    8.4      29 Mar 2019 06:39  Phos  2.8     03-29  Mg     2.1     03-29    TPro  6.8  /  Alb  3.2<L>  /  TBili  0.5  /  DBili  x   /  AST  16  /  ALT  14  /  AlkPhos  71  03-29    PT/INR - ( 28 Mar 2019 14:02 )   PT: 11.3 sec;   INR: 1.02 ratio                 RADIOLOGY & ADDITIONAL STUDIES REVIEWED:      GOALS OF CARE DISCUSSION WITH PATIENT/FAMILY/PROXY/ QUESTIONS WERE ANSWERED TO THE BEST OF MY ABILITIES    CRITICAL CARE TIME SPENT: 35 minutes INTERVAL HPI/OVERNIGHT EVENTS:   Patient started feeling blurry vision after she went to bathroom and was out of bed to chair with pale skin, BP checked showed BP of 50-70/40 mm hg, BP meds held, started on pheny and 1 L Fluid bolus given , BP improved     PRESSORS: [ ] YES [ x] NO    Antimicrobial:    Cardiovascular:  ATENolol  Tablet 50 milliGRAM(s) Oral daily  hydrALAZINE 25 milliGRAM(s) Oral every 8 hours  losartan 100 milliGRAM(s) Oral daily  niCARdipine Infusion 5 mG/Hr IV Continuous <Continuous>    Pulmonary:    Hematalogic:  aspirin enteric coated 81 milliGRAM(s) Oral daily  heparin  Injectable 5000 Unit(s) SubCutaneous every 8 hours    Other:  chlorhexidine 4% Liquid 1 Application(s) Topical every 12 hours  insulin lispro (HumaLOG) corrective regimen sliding scale   SubCutaneous three times a day before meals  simvastatin 40 milliGRAM(s) Oral at bedtime  sodium chloride 0.9% lock flush 3 milliLiter(s) IV Push every 8 hours      Drug Dosing Weight  Height (cm): 157.48 (28 Mar 2019 12:29)  Weight (kg): 77.1 (28 Mar 2019 12:29)  BMI (kg/m2): 31.1 (28 Mar 2019 12:29)  BSA (m2): 1.78 (28 Mar 2019 12:29)    CENTRAL LINE: [ ] YES [x ] NO  LOCATION:     REMOVE: [ ] YES [ ] NO      [place]: [ ] YES [x ] NO      REMOVE:  [ ] YES [ ] NO      A-LINE:  [x ] YES [ ] NO  LOCATION:   Right A line   REMOVE:  [ ] YES [ ] NO      PMH/Social Hx/Fam Hx -reviewed admission note, no change since admission  PAST MEDICAL & SURGICAL HISTORY:  PVD (peripheral vascular disease)  Multiple thyroid nodules  DM (diabetes mellitus)  Hypertension  Carotid artery stenosis  Status post biopsy of thyroid gland: FNA of thyroid nodules  History of left breast biopsy        T(C): 36.7 (03-29-19 @ 21:03), Max: 36.7 (03-29-19 @ 21:03)  HR: 62 (03-29-19 @ 22:30)  BP: 121/42 (03-29-19 @ 22:00)  BP(mean): 60 (03-29-19 @ 22:00)  ABP: 130/47 (03-29-19 @ 22:30)  ABP(mean): 72 (03-29-19 @ 22:30)  RR: 22 (03-29-19 @ 22:30)  SpO2: 96% (03-29-19 @ 22:30)  Wt(kg): --          03-28 @ 07:01  -  03-29 @ 07:00  --------------------------------------------------------  IN: 1377.5 mL / OUT: 1825 mL / NET: -447.5 mL    PHYSICAL EXAM:    General - NAD, sitting up in bed, well groomed  Neck - Left sided dressing normal  Lymph Nodes - No lymphadenopathy  Cardiovascular - RRR no m/r/g, no JVD, no carotid bruits  Lungs - Clear to ascultation, no use of accessory muscles, no crackles or wheezes.  Abdomen - Normal bowel sounds, abdomen soft and nontender  Extremities - No edema, cyanosis or clubbing  Musculo Skeletal - 5/5 strength upper and lower extremity.  .  Neurological – Alert and oriented x 3        LABS:  CBC Full  -  ( 29 Mar 2019 06:39 )  WBC Count : 13.13 K/uL  RBC Count : 4.14 M/uL  Hemoglobin : 12.2 g/dL  Hematocrit : 38.0 %  Platelet Count - Automated : 154 K/uL  Mean Cell Volume : 91.8 fl  Mean Cell Hemoglobin : 29.5 pg  Mean Cell Hemoglobin Concentration : 32.1 gm/dL  Auto Neutrophil # : 10.36 K/uL  Auto Lymphocyte # : 1.68 K/uL  Auto Monocyte # : 0.99 K/uL  Auto Eosinophil # : 0.01 K/uL  Auto Basophil # : 0.01 K/uL  Auto Neutrophil % : 78.9 %  Auto Lymphocyte % : 12.8 %  Auto Monocyte % : 7.5 %  Auto Eosinophil % : 0.1 %  Auto Basophil % : 0.1 %    03-29    140  |  109<H>  |  22<H>  ----------------------------<  86  4.1   |  24  |  1.12    Ca    8.4      29 Mar 2019 06:39  Phos  2.8     03-29  Mg     2.1     03-29    TPro  6.8  /  Alb  3.2<L>  /  TBili  0.5  /  DBili  x   /  AST  16  /  ALT  14  /  AlkPhos  71  03-29    PT/INR - ( 28 Mar 2019 14:02 )   PT: 11.3 sec;   INR: 1.02 ratio                 RADIOLOGY & ADDITIONAL STUDIES REVIEWED:      GOALS OF CARE DISCUSSION WITH PATIENT/FAMILY/PROXY/ QUESTIONS WERE ANSWERED TO THE BEST OF MY ABILITIES    CRITICAL CARE TIME SPENT: 35 minutes

## 2019-03-30 NOTE — PROGRESS NOTE ADULT - ASSESSMENT
73 F with HTN DM HLD PVD admitted to ICU for post op monitoring after left carotid endarterectomy on 3/28.     1)Post op monitoring  - Neuro check and pulse check Q2 hour  - BP goal SBP between 100-150  - Uncontrolled on oral meds and hence started on nicardipine drip   - C/W aspirin and atorvastatin  - pt already received protamine sulphate in PACU as per surgical PA  - Restarted asa, hep sc    2)Hypertensive urgency   - Uncontrolled on oral meds and hence started on nicardipine drip   - C/W oral BP medications. Adjust as needed  - BP goal SBP between 100-150    3)DM   - on metformin at home  - will keep on HSS and accu check   - A1c : 6    4)HLD and PVD  - atorvastatin and aspirin    5)DVt PPx with hep sc and gi ppx with pepcid 73 F with HTN DM HLD PVD admitted to ICU for post op monitoring after left carotid endarterectomy on 3/28.     1)Post op monitoring  - Neuro check and pulse check Q2 hour  - BP goal SBP between 100-150  - Uncontrolled on oral meds and hence started on nicardipine drip   - C/W aspirin and atorvastatin  - pt already received protamine sulphate in PACU as per surgical PA  - Restarted asa, hep sc    2)Hypertensive urgency   - BP was low this morning , improved after temporary pheny and IVF  - will dc nicardipine drip   - dc hydralazine , lower doses of Atenolol, Procardia, losartan  - C/W oral BP medications. Adjust as needed  - BP goal SBP between 100-150    3)DM   - on metformin at home  - will keep on HSS and accu check   - A1c : 6    4)HLD and PVD  - atorvastatin and aspirin    5)DVt PPx with hep sc and gi ppx with pepcid

## 2019-03-31 LAB
ALBUMIN SERPL ELPH-MCNC: 3 G/DL — LOW (ref 3.5–5)
ALP SERPL-CCNC: 73 U/L — SIGNIFICANT CHANGE UP (ref 40–120)
ALT FLD-CCNC: 16 U/L DA — SIGNIFICANT CHANGE UP (ref 10–60)
ANION GAP SERPL CALC-SCNC: 5 MMOL/L — SIGNIFICANT CHANGE UP (ref 5–17)
AST SERPL-CCNC: 15 U/L — SIGNIFICANT CHANGE UP (ref 10–40)
BASOPHILS # BLD AUTO: 0.03 K/UL — SIGNIFICANT CHANGE UP (ref 0–0.2)
BASOPHILS NFR BLD AUTO: 0.3 % — SIGNIFICANT CHANGE UP (ref 0–2)
BILIRUB SERPL-MCNC: 0.6 MG/DL — SIGNIFICANT CHANGE UP (ref 0.2–1.2)
BUN SERPL-MCNC: 21 MG/DL — HIGH (ref 7–18)
CALCIUM SERPL-MCNC: 8.2 MG/DL — LOW (ref 8.4–10.5)
CHLORIDE SERPL-SCNC: 113 MMOL/L — HIGH (ref 96–108)
CO2 SERPL-SCNC: 23 MMOL/L — SIGNIFICANT CHANGE UP (ref 22–31)
CREAT SERPL-MCNC: 0.9 MG/DL — SIGNIFICANT CHANGE UP (ref 0.5–1.3)
EOSINOPHIL # BLD AUTO: 0.08 K/UL — SIGNIFICANT CHANGE UP (ref 0–0.5)
EOSINOPHIL NFR BLD AUTO: 0.7 % — SIGNIFICANT CHANGE UP (ref 0–6)
GLUCOSE SERPL-MCNC: 92 MG/DL — SIGNIFICANT CHANGE UP (ref 70–99)
HCT VFR BLD CALC: 39.1 % — SIGNIFICANT CHANGE UP (ref 34.5–45)
HGB BLD-MCNC: 12.5 G/DL — SIGNIFICANT CHANGE UP (ref 11.5–15.5)
IMM GRANULOCYTES NFR BLD AUTO: 0.8 % — SIGNIFICANT CHANGE UP (ref 0–1.5)
LYMPHOCYTES # BLD AUTO: 2.59 K/UL — SIGNIFICANT CHANGE UP (ref 1–3.3)
LYMPHOCYTES # BLD AUTO: 23.6 % — SIGNIFICANT CHANGE UP (ref 13–44)
MAGNESIUM SERPL-MCNC: 2 MG/DL — SIGNIFICANT CHANGE UP (ref 1.6–2.6)
MCHC RBC-ENTMCNC: 29.5 PG — SIGNIFICANT CHANGE UP (ref 27–34)
MCHC RBC-ENTMCNC: 32 GM/DL — SIGNIFICANT CHANGE UP (ref 32–36)
MCV RBC AUTO: 92.2 FL — SIGNIFICANT CHANGE UP (ref 80–100)
MONOCYTES # BLD AUTO: 1.06 K/UL — HIGH (ref 0–0.9)
MONOCYTES NFR BLD AUTO: 9.7 % — SIGNIFICANT CHANGE UP (ref 2–14)
NEUTROPHILS # BLD AUTO: 7.11 K/UL — SIGNIFICANT CHANGE UP (ref 1.8–7.4)
NEUTROPHILS NFR BLD AUTO: 64.9 % — SIGNIFICANT CHANGE UP (ref 43–77)
NRBC # BLD: 0 /100 WBCS — SIGNIFICANT CHANGE UP (ref 0–0)
PHOSPHATE SERPL-MCNC: 2.3 MG/DL — LOW (ref 2.5–4.5)
PLATELET # BLD AUTO: 218 K/UL — SIGNIFICANT CHANGE UP (ref 150–400)
POTASSIUM SERPL-MCNC: 3.8 MMOL/L — SIGNIFICANT CHANGE UP (ref 3.5–5.3)
POTASSIUM SERPL-SCNC: 3.8 MMOL/L — SIGNIFICANT CHANGE UP (ref 3.5–5.3)
PROT SERPL-MCNC: 7.1 G/DL — SIGNIFICANT CHANGE UP (ref 6–8.3)
RBC # BLD: 4.24 M/UL — SIGNIFICANT CHANGE UP (ref 3.8–5.2)
RBC # FLD: 13.1 % — SIGNIFICANT CHANGE UP (ref 10.3–14.5)
SODIUM SERPL-SCNC: 141 MMOL/L — SIGNIFICANT CHANGE UP (ref 135–145)
WBC # BLD: 10.96 K/UL — HIGH (ref 3.8–10.5)
WBC # FLD AUTO: 10.96 K/UL — HIGH (ref 3.8–10.5)

## 2019-03-31 RX ORDER — SODIUM,POTASSIUM PHOSPHATES 278-250MG
1 POWDER IN PACKET (EA) ORAL
Qty: 0 | Refills: 0 | Status: COMPLETED | OUTPATIENT
Start: 2019-03-31 | End: 2019-04-01

## 2019-03-31 RX ADMIN — SODIUM CHLORIDE 3 MILLILITER(S): 9 INJECTION INTRAMUSCULAR; INTRAVENOUS; SUBCUTANEOUS at 21:40

## 2019-03-31 RX ADMIN — SIMVASTATIN 40 MILLIGRAM(S): 20 TABLET, FILM COATED ORAL at 21:38

## 2019-03-31 RX ADMIN — HEPARIN SODIUM 5000 UNIT(S): 5000 INJECTION INTRAVENOUS; SUBCUTANEOUS at 21:38

## 2019-03-31 RX ADMIN — HEPARIN SODIUM 5000 UNIT(S): 5000 INJECTION INTRAVENOUS; SUBCUTANEOUS at 13:06

## 2019-03-31 RX ADMIN — Medication 81 MILLIGRAM(S): at 13:05

## 2019-03-31 RX ADMIN — Medication 100 MILLIGRAM(S): at 06:38

## 2019-03-31 RX ADMIN — SODIUM CHLORIDE 3 MILLILITER(S): 9 INJECTION INTRAMUSCULAR; INTRAVENOUS; SUBCUTANEOUS at 14:00

## 2019-03-31 RX ADMIN — CHLORHEXIDINE GLUCONATE 1 APPLICATION(S): 213 SOLUTION TOPICAL at 06:39

## 2019-03-31 RX ADMIN — SODIUM CHLORIDE 3 MILLILITER(S): 9 INJECTION INTRAMUSCULAR; INTRAVENOUS; SUBCUTANEOUS at 06:40

## 2019-03-31 RX ADMIN — SENNA PLUS 2 TABLET(S): 8.6 TABLET ORAL at 21:37

## 2019-03-31 RX ADMIN — Medication 1 TABLET(S): at 18:26

## 2019-03-31 RX ADMIN — Medication 1 TABLET(S): at 13:05

## 2019-03-31 RX ADMIN — HEPARIN SODIUM 5000 UNIT(S): 5000 INJECTION INTRAVENOUS; SUBCUTANEOUS at 06:38

## 2019-03-31 RX ADMIN — CHLORHEXIDINE GLUCONATE 1 APPLICATION(S): 213 SOLUTION TOPICAL at 18:25

## 2019-03-31 RX ADMIN — Medication 1 TABLET(S): at 21:37

## 2019-03-31 NOTE — PROGRESS NOTE ADULT - SUBJECTIVE AND OBJECTIVE BOX
INTERVAL HPI/OVERNIGHT EVENTS:   Need for phenylephrine to keep BP elevated     PRESSORS: [ x] YES [] NO    Phenylephrine        Antimicrobial:    Cardiovascular:  ATENolol  Tablet 25 milliGRAM(s) Oral daily  losartan 75 milliGRAM(s) Oral daily  NIFEdipine XL 60 milliGRAM(s) Oral daily  phenylephrine    Infusion 0.5 MICROgram(s)/kG/Min IV Continuous <Continuous>    Pulmonary:    Hematalogic:  aspirin enteric coated 81 milliGRAM(s) Oral daily  heparin  Injectable 5000 Unit(s) SubCutaneous every 8 hours    Other:  chlorhexidine 4% Liquid 1 Application(s) Topical every 12 hours  docusate sodium 100 milliGRAM(s) Oral two times a day PRN  insulin lispro (HumaLOG) corrective regimen sliding scale   SubCutaneous three times a day before meals  senna 2 Tablet(s) Oral at bedtime  simvastatin 40 milliGRAM(s) Oral at bedtime  sodium chloride 0.9% lock flush 3 milliLiter(s) IV Push every 8 hours      Drug Dosing Weight  Height (cm): 157.48 (28 Mar 2019 12:29)  Weight (kg): 77.1 (28 Mar 2019 12:29)  BMI (kg/m2): 31.1 (28 Mar 2019 12:29)  BSA (m2): 1.78 (28 Mar 2019 12:29)    CENTRAL LINE: [ ] YES [x ] NO  LOCATION:     REMOVE: [ ] YES [ ] NO      [place]: [ ] YES [x ] NO      REMOVE:  [ ] YES [ ] NO      A-LINE:  [x ] YES [ ] NO  LOCATION:   Right A line   REMOVE:  [ ] YES [ ] NO      PMH/Social Hx/Fam Hx -reviewed admission note, no change since admission  PAST MEDICAL & SURGICAL HISTORY:  PVD (peripheral vascular disease)  Multiple thyroid nodules  DM (diabetes mellitus)  Hypertension  Carotid artery stenosis  Status post biopsy of thyroid gland: FNA of thyroid nodules  History of left breast biopsy        T(C): 36.3 (03-30-19 @ 19:30), Max: 36.8 (03-30-19 @ 12:00)  HR: 64 (03-30-19 @ 23:30)  BP: 148/45 (03-30-19 @ 23:00)  BP(mean): 68 (03-30-19 @ 23:00)  ABP: 100/57 (03-30-19 @ 23:30)  ABP(mean): 74 (03-30-19 @ 23:30)  RR: 18 (03-30-19 @ 23:30)  SpO2: 96% (03-30-19 @ 23:30)  Wt(kg): --          03-29 @ 07:01  -  03-30 @ 07:00  --------------------------------------------------------  IN: 1090 mL / OUT: 1400 mL / NET: -310 mL            PHYSICAL EXAM:    General - NAD, sitting up in bed, well groomed  Neck - Left sided dressing normal  Lymph Nodes - No lymphadenopathy  Cardiovascular - RRR no m/r/g, no JVD, no carotid bruits  Lungs - Clear to ascultation, no use of accessory muscles, no crackles or wheezes.  Abdomen - Normal bowel sounds, abdomen soft and nontender  Extremities - No edema, cyanosis or clubbing  Musculo Skeletal - 5/5 strength upper and lower extremity.  .  Neurological – Alert and oriented x 3        LABS:  CBC Full  -  ( 30 Mar 2019 06:31 )  WBC Count : 9.81 K/uL  RBC Count : 3.79 M/uL  Hemoglobin : 11.1 g/dL  Hematocrit : 35.1 %  Platelet Count - Automated : 192 K/uL  Mean Cell Volume : 92.6 fl  Mean Cell Hemoglobin : 29.3 pg  Mean Cell Hemoglobin Concentration : 31.6 gm/dL  Auto Neutrophil # : 6.75 K/uL  Auto Lymphocyte # : 2.03 K/uL  Auto Monocyte # : 0.89 K/uL  Auto Eosinophil # : 0.04 K/uL  Auto Basophil # : 0.03 K/uL  Auto Neutrophil % : 68.8 %  Auto Lymphocyte % : 20.7 %  Auto Monocyte % : 9.1 %  Auto Eosinophil % : 0.4 %  Auto Basophil % : 0.3 %    03-30    142  |  112<H>  |  23<H>  ----------------------------<  88  3.6   |  23  |  0.89    Ca    7.3<L>      30 Mar 2019 06:31  Phos  1.9     03-30  Mg     1.8     03-30    TPro  6.2  /  Alb  2.7<L>  /  TBili  0.5  /  DBili  x   /  AST  12  /  ALT  12  /  AlkPhos  61  03-30            RADIOLOGY & ADDITIONAL STUDIES REVIEWED:      GOALS OF CARE DISCUSSION WITH PATIENT/FAMILY/PROXY/ QUESTIONS WERE ANSWERED TO THE BEST OF MY ABILITIES    CRITICAL CARE TIME SPENT: 35 minutes INTERVAL HPI/OVERNIGHT EVENTS:   Need for phenylephrine to keep BP elevated     PRESSORS: [ x] YES [] NO    Phenylephrine        Antimicrobial:    Cardiovascular:  ATENolol  Tablet 25 milliGRAM(s) Oral daily  losartan 75 milliGRAM(s) Oral daily  NIFEdipine XL 60 milliGRAM(s) Oral daily  phenylephrine    Infusion 0.5 MICROgram(s)/kG/Min IV Continuous <Continuous>    Pulmonary:    Hematalogic:  aspirin enteric coated 81 milliGRAM(s) Oral daily  heparin  Injectable 5000 Unit(s) SubCutaneous every 8 hours    Other:  chlorhexidine 4% Liquid 1 Application(s) Topical every 12 hours  docusate sodium 100 milliGRAM(s) Oral two times a day PRN  insulin lispro (HumaLOG) corrective regimen sliding scale   SubCutaneous three times a day before meals  senna 2 Tablet(s) Oral at bedtime  simvastatin 40 milliGRAM(s) Oral at bedtime  sodium chloride 0.9% lock flush 3 milliLiter(s) IV Push every 8 hours      Drug Dosing Weight  Height (cm): 157.48 (28 Mar 2019 12:29)  Weight (kg): 77.1 (28 Mar 2019 12:29)  BMI (kg/m2): 31.1 (28 Mar 2019 12:29)  BSA (m2): 1.78 (28 Mar 2019 12:29)    CENTRAL LINE: [ ] YES [x ] NO  LOCATION:     REMOVE: [ ] YES [ ] NO      [place]: [ ] YES [x ] NO      REMOVE:  [ ] YES [ ] NO      A-LINE:  [x ] YES [ ] NO  LOCATION:   Right A line   REMOVE:  [ ] YES [ ] NO      PMH/Social Hx/Fam Hx -reviewed admission note, no change since admission  PAST MEDICAL & SURGICAL HISTORY:  PVD (peripheral vascular disease)  Multiple thyroid nodules  DM (diabetes mellitus)  Hypertension  Carotid artery stenosis  Status post biopsy of thyroid gland: FNA of thyroid nodules  History of left breast biopsy        T(C): 36.3 (03-30-19 @ 19:30), Max: 36.8 (03-30-19 @ 12:00)  HR: 64 (03-30-19 @ 23:30)  BP: 148/45 (03-30-19 @ 23:00)  BP(mean): 68 (03-30-19 @ 23:00)  ABP: 100/57 (03-30-19 @ 23:30)  ABP(mean): 74 (03-30-19 @ 23:30)  RR: 18 (03-30-19 @ 23:30)  SpO2: 96% (03-30-19 @ 23:30)  Wt(kg): --          03-29 @ 07:01  -  03-30 @ 07:00  --------------------------------------------------------  IN: 1090 mL / OUT: 1400 mL / NET: -310 mL            PHYSICAL EXAM:    General - NAD, sitting up in bed, well groomed  Neck - Left sided dressing normal  Lymph Nodes - No lymphadenopathy  Cardiovascular - RRR no m/r/g, no JVD, no carotid bruits  Lungs - Clear to ascultation, no use of accessory muscles, no crackles or wheezes.  Abdomen - Normal bowel sounds, abdomen soft and nontender  Extremities - No edema, cyanosis or clubbing  Musculo Skeletal - 5/5 strength upper and lower extremity.  .  Neurological – Alert and oriented x 3       LABS:  CBC Full  -  ( 30 Mar 2019 06:31 )  WBC Count : 9.81 K/uL  RBC Count : 3.79 M/uL  Hemoglobin : 11.1 g/dL  Hematocrit : 35.1 %  Platelet Count - Automated : 192 K/uL  Mean Cell Volume : 92.6 fl  Mean Cell Hemoglobin : 29.3 pg  Mean Cell Hemoglobin Concentration : 31.6 gm/dL  Auto Neutrophil # : 6.75 K/uL  Auto Lymphocyte # : 2.03 K/uL  Auto Monocyte # : 0.89 K/uL  Auto Eosinophil # : 0.04 K/uL  Auto Basophil # : 0.03 K/uL  Auto Neutrophil % : 68.8 %  Auto Lymphocyte % : 20.7 %  Auto Monocyte % : 9.1 %  Auto Eosinophil % : 0.4 %  Auto Basophil % : 0.3 %    03-30    142  |  112<H>  |  23<H>  ----------------------------<  88  3.6   |  23  |  0.89    Ca    7.3<L>      30 Mar 2019 06:31  Phos  1.9     03-30  Mg     1.8     03-30    TPro  6.2  /  Alb  2.7<L>  /  TBili  0.5  /  DBili  x   /  AST  12  /  ALT  12  /  AlkPhos  61  03-30            RADIOLOGY & ADDITIONAL STUDIES REVIEWED:      GOALS OF CARE DISCUSSION WITH PATIENT/FAMILY/PROXY/ QUESTIONS WERE ANSWERED TO THE BEST OF MY ABILITIES    CRITICAL CARE TIME SPENT: 35 minutes

## 2019-03-31 NOTE — PROGRESS NOTE ADULT - PROBLEM SELECTOR PLAN 1
BP control, recommend <160 systolic  If HA continues or worsens, recommend CT head
BP management, wean off pressor at tolerated

## 2019-03-31 NOTE — PROGRESS NOTE ADULT - ASSESSMENT
73 F with HTN DM HLD PVD admitted to ICU for post op monitoring after left carotid endarterectomy on 3/28.     1)Post op monitoring  - Neuro check and pulse check Q2 hour  - BP goal SBP between 100-150  - Currently on Phenylephrine to keep BP elevated   - C/W aspirin and atorvastatin  - pt already received protamine sulphate in PACU as per surgical PA  - Restarted asa, hep sc    2)Hypertensive urgency   - BP was low this morning , improved after temporary pheny and IVF  - BP medications : Atenolol, Procardia, losartan on hold due to low BP  - BP goal SBP between 100-150    3)DM   - on metformin at home  - will keep on HSS and accu check   - A1c : 6    4)HLD and PVD  - atorvastatin and aspirin    5)DVt PPx with hep sc and gi ppx with pepcid

## 2019-03-31 NOTE — PROGRESS NOTE ADULT - SUBJECTIVE AND OBJECTIVE BOX
INTERVAL HPI/OVERNIGHT EVENTS:  Pt resting comfortably. No acute complaints.   Denies HA.  PO HTN meds d/c'ed after pt was found to be hypotensive, requiring pressor to keep BP stable.    MEDICATIONS  (STANDING):  aspirin enteric coated 81 milliGRAM(s) Oral daily  chlorhexidine 4% Liquid 1 Application(s) Topical every 12 hours  heparin  Injectable 5000 Unit(s) SubCutaneous every 8 hours  insulin lispro (HumaLOG) corrective regimen sliding scale   SubCutaneous three times a day before meals  phenylephrine    Infusion 0.5 MICROgram(s)/kG/Min (7.228 mL/Hr) IV Continuous <Continuous>  potassium acid phosphate/sodium acid phosphate tablet (K-PHOS No. 2) 1 Tablet(s) Oral four times a day with meals  senna 2 Tablet(s) Oral at bedtime  simvastatin 40 milliGRAM(s) Oral at bedtime  sodium chloride 0.9% lock flush 3 milliLiter(s) IV Push every 8 hours    MEDICATIONS  (PRN):  docusate sodium 100 milliGRAM(s) Oral two times a day PRN Constipation      Vital Signs Last 24 Hrs  T(C): 36.7 (31 Mar 2019 00:00), Max: 36.7 (30 Mar 2019 16:00)  T(F): 98 (31 Mar 2019 00:00), Max: 98.1 (30 Mar 2019 16:00)  HR: 59 (31 Mar 2019 10:00) (55 - 78)  BP: 119/45 (31 Mar 2019 10:00) (113/43 - 170/64)  BP(mean): 63 (31 Mar 2019 10:00) (54 - 109)  RR: 20 (31 Mar 2019 10:00) (11 - 29)  SpO2: 97% (31 Mar 2019 10:00) (93% - 98%)    Physical:  General: A&Ox3. NAD.  HEENT: Dressing C/D/I. No surrounding fluctuance or erythema    LABS:                        12.5   10.96 )-----------( 218      ( 31 Mar 2019 06:56 )             39.1             03-31    141  |  113<H>  |  21<H>  ----------------------------<  92  3.8   |  23  |  0.90    Ca    8.2<L>      31 Mar 2019 06:56  Phos  2.3     03-31  Mg     2.0     03-31    TPro  7.1  /  Alb  3.0<L>  /  TBili  0.6  /  DBili  x   /  AST  15  /  ALT  16  /  AlkPhos  73  03-31

## 2019-04-01 LAB
ANION GAP SERPL CALC-SCNC: 6 MMOL/L — SIGNIFICANT CHANGE UP (ref 5–17)
BUN SERPL-MCNC: 20 MG/DL — HIGH (ref 7–18)
CALCIUM SERPL-MCNC: 8.4 MG/DL — SIGNIFICANT CHANGE UP (ref 8.4–10.5)
CHLORIDE SERPL-SCNC: 112 MMOL/L — HIGH (ref 96–108)
CO2 SERPL-SCNC: 23 MMOL/L — SIGNIFICANT CHANGE UP (ref 22–31)
CREAT SERPL-MCNC: 0.93 MG/DL — SIGNIFICANT CHANGE UP (ref 0.5–1.3)
GLUCOSE SERPL-MCNC: 98 MG/DL — SIGNIFICANT CHANGE UP (ref 70–99)
HCT VFR BLD CALC: 37.5 % — SIGNIFICANT CHANGE UP (ref 34.5–45)
HGB BLD-MCNC: 12.1 G/DL — SIGNIFICANT CHANGE UP (ref 11.5–15.5)
MAGNESIUM SERPL-MCNC: 2.1 MG/DL — SIGNIFICANT CHANGE UP (ref 1.6–2.6)
MCHC RBC-ENTMCNC: 29.6 PG — SIGNIFICANT CHANGE UP (ref 27–34)
MCHC RBC-ENTMCNC: 32.3 GM/DL — SIGNIFICANT CHANGE UP (ref 32–36)
MCV RBC AUTO: 91.7 FL — SIGNIFICANT CHANGE UP (ref 80–100)
NRBC # BLD: 0 /100 WBCS — SIGNIFICANT CHANGE UP (ref 0–0)
PHOSPHATE SERPL-MCNC: 2.9 MG/DL — SIGNIFICANT CHANGE UP (ref 2.5–4.5)
PLATELET # BLD AUTO: 195 K/UL — SIGNIFICANT CHANGE UP (ref 150–400)
POTASSIUM SERPL-MCNC: 3.8 MMOL/L — SIGNIFICANT CHANGE UP (ref 3.5–5.3)
POTASSIUM SERPL-SCNC: 3.8 MMOL/L — SIGNIFICANT CHANGE UP (ref 3.5–5.3)
RBC # BLD: 4.09 M/UL — SIGNIFICANT CHANGE UP (ref 3.8–5.2)
RBC # FLD: 13 % — SIGNIFICANT CHANGE UP (ref 10.3–14.5)
SODIUM SERPL-SCNC: 141 MMOL/L — SIGNIFICANT CHANGE UP (ref 135–145)
SURGICAL PATHOLOGY STUDY: SIGNIFICANT CHANGE UP
WBC # BLD: 8.98 K/UL — SIGNIFICANT CHANGE UP (ref 3.8–10.5)
WBC # FLD AUTO: 8.98 K/UL — SIGNIFICANT CHANGE UP (ref 3.8–10.5)

## 2019-04-01 RX ORDER — ATENOLOL 25 MG/1
25 TABLET ORAL DAILY
Qty: 0 | Refills: 0 | Status: DISCONTINUED | OUTPATIENT
Start: 2019-04-01 | End: 2019-04-02

## 2019-04-01 RX ORDER — LOSARTAN POTASSIUM 100 MG/1
25 TABLET, FILM COATED ORAL DAILY
Qty: 0 | Refills: 0 | Status: DISCONTINUED | OUTPATIENT
Start: 2019-04-01 | End: 2019-04-02

## 2019-04-01 RX ADMIN — HEPARIN SODIUM 5000 UNIT(S): 5000 INJECTION INTRAVENOUS; SUBCUTANEOUS at 14:11

## 2019-04-01 RX ADMIN — SODIUM CHLORIDE 3 MILLILITER(S): 9 INJECTION INTRAMUSCULAR; INTRAVENOUS; SUBCUTANEOUS at 15:31

## 2019-04-01 RX ADMIN — SODIUM CHLORIDE 3 MILLILITER(S): 9 INJECTION INTRAMUSCULAR; INTRAVENOUS; SUBCUTANEOUS at 21:01

## 2019-04-01 RX ADMIN — LOSARTAN POTASSIUM 25 MILLIGRAM(S): 100 TABLET, FILM COATED ORAL at 17:29

## 2019-04-01 RX ADMIN — CHLORHEXIDINE GLUCONATE 1 APPLICATION(S): 213 SOLUTION TOPICAL at 17:28

## 2019-04-01 RX ADMIN — HEPARIN SODIUM 5000 UNIT(S): 5000 INJECTION INTRAVENOUS; SUBCUTANEOUS at 06:16

## 2019-04-01 RX ADMIN — HEPARIN SODIUM 5000 UNIT(S): 5000 INJECTION INTRAVENOUS; SUBCUTANEOUS at 21:04

## 2019-04-01 RX ADMIN — SODIUM CHLORIDE 3 MILLILITER(S): 9 INJECTION INTRAMUSCULAR; INTRAVENOUS; SUBCUTANEOUS at 06:30

## 2019-04-01 RX ADMIN — SIMVASTATIN 40 MILLIGRAM(S): 20 TABLET, FILM COATED ORAL at 21:04

## 2019-04-01 RX ADMIN — Medication 81 MILLIGRAM(S): at 14:11

## 2019-04-01 RX ADMIN — Medication 1 TABLET(S): at 08:32

## 2019-04-01 RX ADMIN — ATENOLOL 25 MILLIGRAM(S): 25 TABLET ORAL at 17:29

## 2019-04-01 RX ADMIN — CHLORHEXIDINE GLUCONATE 1 APPLICATION(S): 213 SOLUTION TOPICAL at 06:16

## 2019-04-01 NOTE — PROGRESS NOTE ADULT - ASSESSMENT
74 y/o Female s/p Left Carotid endarterectomy 3/28    -BP control   -Wean off Pressor    -OOB/ambulate   -DVT ppx   -Incentive spirometry   -ICU downgrade

## 2019-04-01 NOTE — PROGRESS NOTE ADULT - ASSESSMENT
73 F with HTN DM HLD PVD admitted to ICU for post op monitoring after left carotid endarterectomy on 3/28.     1)Post op monitoring  - Neuro check and pulse check Q2 hour  - BP goal SBP between 100-150  - Currently on Phenylephrine to keep BP elevated   - C/W aspirin and atorvastatin  - pt already received protamine sulphate in PACU as per surgical PA  - Restarted asa, hep sc    2)Hypertensive urgency   - BP medications : Atenolol, Procardia, losartan on hold due to low BP  - BP goal SBP between 100-150    3)DM   - on metformin at home  - will keep on HSS and accu check   - A1c : 6    4)HLD and PVD  - atorvastatin and aspirin    5)DVt PPx with hep sc and gi ppx with pepcid 73 F with HTN DM HLD PVD admitted to ICU for post op monitoring after left carotid endarterectomy on 3/28.     1)Post op monitoring  - As per surgery no more intervention. Follow up as outpatient with Dr Crane  - C/W aspirin and atorvastatin    2)Hypertensive urgency   - C/W BP medication WILL RESTART AT LOWER DOSES  - Atenolol, Procardia, losartan on hold due to low BP    3)DM   - on metformin at home  - will keep on HSS and accu check   - A1c : 6    4)HLD and PVD  - atorvastatin and aspirin    5)DVt PPx with hep sc and gi ppx with pepcid

## 2019-04-01 NOTE — PROGRESS NOTE ADULT - SUBJECTIVE AND OBJECTIVE BOX
INTERVAL HPI/OVERNIGHT EVENTS:       Antimicrobial:    Cardiovascular:  ATENolol  Tablet 25 milliGRAM(s) Oral daily  losartan 25 milliGRAM(s) Oral daily  phenylephrine    Infusion 0.5 MICROgram(s)/kG/Min IV Continuous <Continuous>    Pulmonary:    Hematalogic:  aspirin enteric coated 81 milliGRAM(s) Oral daily  heparin  Injectable 5000 Unit(s) SubCutaneous every 8 hours    Other:  chlorhexidine 4% Liquid 1 Application(s) Topical every 12 hours  docusate sodium 100 milliGRAM(s) Oral two times a day PRN  insulin lispro (HumaLOG) corrective regimen sliding scale   SubCutaneous three times a day before meals  senna 2 Tablet(s) Oral at bedtime  simvastatin 40 milliGRAM(s) Oral at bedtime  sodium chloride 0.9% lock flush 3 milliLiter(s) IV Push every 8 hours      Drug Dosing Weight  Height (cm): 157.48 (28 Mar 2019 12:29)  Weight (kg): 77.1 (28 Mar 2019 12:29)  BMI (kg/m2): 31.1 (28 Mar 2019 12:29)  BSA (m2): 1.78 (28 Mar 2019 12:29)    CENTRAL LINE: [ ] YES [ ] NO  LOCATION:   DATE INSERTED:    [place]: [ ] YES [ ] NO    DATE INSERTED:    A-LINE:  [ ] YES [ ] NO  LOCATION:   DATE INSERTED:    PMH/Social Hx/Fam Hx -reviewed admission note, no change since admission  PAST MEDICAL & SURGICAL HISTORY:  PVD (peripheral vascular disease)  Multiple thyroid nodules  DM (diabetes mellitus)  Hypertension  Carotid artery stenosis  Status post biopsy of thyroid gland: FNA of thyroid nodules  History of left breast biopsy      T(C): 36.7 (04-01-19 @ 10:00), Max: 36.9 (03-31-19 @ 16:00)  HR: 72 (04-01-19 @ 11:00)  BP: 107/38 (04-01-19 @ 11:00)  BP(mean): 53 (04-01-19 @ 11:00)  ABP: --  ABP(mean): --  RR: 20 (04-01-19 @ 11:00)  SpO2: 98% (04-01-19 @ 11:00)  Wt(kg): --          03-31 @ 07:01  -  04-01 @ 07:00  --------------------------------------------------------  IN: 907 mL / OUT: 1160 mL / NET: -253 mL            PHYSICAL EXAM:    GENERAL: No signs of distress, comfortable  HEAD: Atraumatic, Normocephalic  EYES: EOMI, PERRLA  ENMT: No erythema, exudates, or enlargement, Moist mucous membranes  NECK: Supple, normal appearance, No JVD; left neck with dressing  CHEST/LUNG: No chest deformity, fair bilateral air entry; No rales, rhonchi, wheezing; crackles  HEART: Regular rate and rhythm; No murmurs, rubs, or gallops;   ABDOMEN: Soft, Nontender, Nondistended; Bowel sounds present  EXTREMITIES:  + Peripheral Pulses, No clubbing, cyanosis, or edema  NERVOUS SYSTEM: awake and alert x 3, follows commands, upper and lower extremities  LYMPH: No lymphadenopathy noted  SKIN: No rashes or lesions; good turgor, warm, dry      LABS:  CBC Full  -  ( 01 Apr 2019 06:53 )  WBC Count : 8.98 K/uL  RBC Count : 4.09 M/uL  Hemoglobin : 12.1 g/dL  Hematocrit : 37.5 %  Platelet Count - Automated : 195 K/uL  Mean Cell Volume : 91.7 fl  Mean Cell Hemoglobin : 29.6 pg  Mean Cell Hemoglobin Concentration : 32.3 gm/dL  Auto Neutrophil # : x  Auto Lymphocyte # : x  Auto Monocyte # : x  Auto Eosinophil # : x  Auto Basophil # : x  Auto Neutrophil % : x  Auto Lymphocyte % : x  Auto Monocyte % : x  Auto Eosinophil % : x  Auto Basophil % : x    04-01    141  |  112<H>  |  20<H>  ----------------------------<  98  3.8   |  23  |  0.93    Ca    8.4      01 Apr 2019 06:53  Phos  2.9     04-01  Mg     2.1     04-01    TPro  7.1  /  Alb  3.0<L>  /  TBili  0.6  /  DBili  x   /  AST  15  /  ALT  16  /  AlkPhos  73  03-31            RADIOLOGY & ADDITIONAL STUDIES REVIEWED         IMPRESSION:  PAST MEDICAL & SURGICAL HISTORY:  PVD (peripheral vascular disease)  Multiple thyroid nodules  DM (diabetes mellitus)  Hypertension  Carotid artery stenosis  Status post biopsy of thyroid gland: FNA of thyroid nodules  History of left breast biopsy   p/w       · Assessment		  73 F with HTN DM HLD PVD admitted to ICU for post op monitoring after left carotid endarterectomy on 3/28.     1)Post op monitoring  - As per surgery no more intervention. Follow up as outpatient with Dr Crane  - C/W aspirin and atorvastatin    2)Hypertensive urgency   - C/W BP medication WILL RESTART AT LOWER DOSES  - Atenolol, Procardia, losartan on hold due to low BP    3)DM   - on metformin at home  - will keep on HSS and accu check   - A1c : 6    4)HLD and PVD  - atorvastatin and aspirin    5)DVt PPx with hep sc and gi ppx with pepcid             GOALS OF CARE DISCUSSION WITH PATIENT/FAMILY/PROXY

## 2019-04-01 NOTE — CHART NOTE - NSCHARTNOTEFT_GEN_A_CORE
ICU transfer Note    73 F with HTN DM HLD PVD admitted to ICU for post op monitoring after left carotid endarterectomy on 3/28.     1)Post op monitoring  - As per surgery no more intervention.  - Follow up as outpatient with Dr Crane  - C/W aspirin and atorvastatin  - Care as per Vascular surgery    2)Hypertensive urgency   - C/W BP medication WILL RESTART AT LOWER DOSES  - C/W Atenolol 25 daily and losartan 25 daily  - Hold Nifedipine and start when appropriate      3)DM   - on metformin at home  - will keep on HSS and accu check   - A1c : 6    4)HLD and PVD  - atorvastatin and aspirin    5)DVt PPx with hep sc and gi ppx with pepcid       Follow up:  - Close BP monitoring  - Care as per Vascular     Patient will be transferred to the surgical floor. Discussed with ICU attending Dr. Centeno. Patient clinically stable for downgrade ICU transfer Note    73 F with HTN DM HLD PVD admitted to ICU for post op monitoring after left carotid endarterectomy on 3/28.     1)Post op monitoring  - As per surgery no more intervention.  - Follow up as outpatient with Dr Crane  - C/W aspirin and atorvastatin  - Care as per Vascular surgery    2)Hypertensive urgency   - C/W BP medication WILL RESTART AT LOWER DOSES  - C/W Atenolol 25 daily and losartan 25 daily  - Hold Nifedipine and start when appropriate      3)DM   - on metformin at home  - will keep on HSS and accu check   - A1c : 6    4)HLD and PVD  - atorvastatin and aspirin    5)DVt PPx with hep sc and gi ppx with pepcid       Follow up:  - Close BP monitoring  - Care as per Vascular     Patient will be transferred to the surgical floor. Discussed with ICU attending Dr. Centeno. Patient clinically stable for downgrade..

## 2019-04-01 NOTE — PROGRESS NOTE ADULT - ATTENDING COMMENTS
Agree with above assessment and plan as transcribed.
Patient seen and examined with resident, Addendum to above.    72 y/o female with history of DM. HTN, HLD incidentally found to have carotid stenosis of left side. S/p left Carotid endarterectomy. Initially requiring nicardipine infusion. BP meds were adjusted and patient was taken of nicardipine. Still on low dose vasopressor. Holding all BP medications. CE negative thus far.     Assessment:  1. Left carotid artery stenosis - s/p left Carotid endarterectomy. Neurovascular checks. Vascular surgery follow up.   2. DM - hold home oral medications. Fingerstick monitoring. Basal/bolus insulin regimen  3. HTN - Hold BP meds for now given hypotension. May need vasopressor support.  4. HLD - cont. statin     - DVT prophylaxis   - Oral diet  - 33 min of critical care time spent on this patient's care
Patient seen and examined with resident, Addendum to above.    72 y/o female with history of DM. HTN, HLD incidentally found to have carotid stenosis of left side. S/p left Carotid endarterectomy. Initially requiring nicardipine infusion. BP meds were adjusted and patient was taken of nicardipine. This morning patient noted to be hypotensive, with light headedness after receiving BP medications.     Assessment:  1. Left carotid artery stenosis - s/p left Carotid endarterectomy. Neurovascular checks. Vascular surgery follow up. TIARA drain discontinued yesterday.   2. DM - hold home oral medications. Fingerstick monitoring. Basal/bolus insulin regimen  3. HTN - Hold BP meds for now given hypotension. May need vasopressor support. Check cardiac markers and EKG.  4. HLD - cont. statin     - DVT prophylaxis   - Oral diet  - 32 min of critical care time spent on this patient's care

## 2019-04-01 NOTE — PROGRESS NOTE ADULT - SUBJECTIVE AND OBJECTIVE BOX
INTERVAL HPI/OVERNIGHT EVENTS:   No overnight events    PRESSORS: [ ] YES [ X] NO    Antimicrobial:    Cardiovascular:  phenylephrine    Infusion 0.5 MICROgram(s)/kG/Min IV Continuous <Continuous>    Pulmonary:    Hematalogic:  aspirin enteric coated 81 milliGRAM(s) Oral daily  heparin  Injectable 5000 Unit(s) SubCutaneous every 8 hours    Other:  chlorhexidine 4% Liquid 1 Application(s) Topical every 12 hours  docusate sodium 100 milliGRAM(s) Oral two times a day PRN  insulin lispro (HumaLOG) corrective regimen sliding scale   SubCutaneous three times a day before meals  senna 2 Tablet(s) Oral at bedtime  simvastatin 40 milliGRAM(s) Oral at bedtime  sodium chloride 0.9% lock flush 3 milliLiter(s) IV Push every 8 hours      Drug Dosing Weight  Height (cm): 157.48 (28 Mar 2019 12:29)  Weight (kg): 77.1 (28 Mar 2019 12:29)  BMI (kg/m2): 31.1 (28 Mar 2019 12:29)  BSA (m2): 1.78 (28 Mar 2019 12:29)    CENTRAL LINE: [ ] YES [X ] NO  LOCATION:     REMOVE: [ ] YES [ ] NO      [place]: [ ] YES [ X] NO      REMOVE:  [ ] YES [ ] NO      A-LINE:  [ ] YES [ X] NO  LOCATION:     REMOVE:  [ ] YES [ ] NO      PMH/Social Hx/Fam Hx -reviewed admission note, no change since admission  PAST MEDICAL & SURGICAL HISTORY:  PVD (peripheral vascular disease)  Multiple thyroid nodules  DM (diabetes mellitus)  Hypertension  Carotid artery stenosis  Status post biopsy of thyroid gland: FNA of thyroid nodules  History of left breast biopsy        T(C): 36.8 (04-01-19 @ 06:00), Max: 36.9 (03-31-19 @ 16:00)  HR: 79 (04-01-19 @ 10:00)  BP: 119/42 (04-01-19 @ 10:00)  BP(mean): 60 (04-01-19 @ 10:00)  ABP: --  ABP(mean): --  RR: 27 (04-01-19 @ 10:00)  SpO2: 97% (04-01-19 @ 10:00)  Wt(kg): --          03-31 @ 07:01  -  04-01 @ 07:00  --------------------------------------------------------  IN: 907 mL / OUT: 1160 mL / NET: -253 mL            PHYSICAL EXAM:    General - NAD, sitting up in bed, well groomed  Neck - Left sided dressing normal  Lymph Nodes - No lymphadenopathy  Cardiovascular - RRR no m/r/g, no JVD, no carotid bruits  Lungs - Clear to ascultation, no use of accessory muscles, no crackles or wheezes.  Abdomen - Normal bowel sounds, abdomen soft and nontender  Extremities - No edema, cyanosis or clubbing  Musculo Skeletal - 5/5 strength upper and lower extremity.  .  Neurological – Alert and oriented x 3     LABS:  CBC Full  -  ( 01 Apr 2019 06:53 )  WBC Count : 8.98 K/uL  RBC Count : 4.09 M/uL  Hemoglobin : 12.1 g/dL  Hematocrit : 37.5 %  Platelet Count - Automated : 195 K/uL  Mean Cell Volume : 91.7 fl  Mean Cell Hemoglobin : 29.6 pg  Mean Cell Hemoglobin Concentration : 32.3 gm/dL  Auto Neutrophil # : x  Auto Lymphocyte # : x  Auto Monocyte # : x  Auto Eosinophil # : x  Auto Basophil # : x  Auto Neutrophil % : x  Auto Lymphocyte % : x  Auto Monocyte % : x  Auto Eosinophil % : x  Auto Basophil % : x    04-01    141  |  112<H>  |  20<H>  ----------------------------<  98  3.8   |  23  |  0.93    Ca    8.4      01 Apr 2019 06:53  Phos  2.9     04-01  Mg     2.1     04-01    TPro  7.1  /  Alb  3.0<L>  /  TBili  0.6  /  DBili  x   /  AST  15  /  ALT  16  /  AlkPhos  73  03-31            RADIOLOGY & ADDITIONAL STUDIES REVIEWED:      GOALS OF CARE DISCUSSION WITH PATIENT/FAMILY/PROXY/ QUESTIONS WERE ANSWERED TO THE BEST OF MY ABILITIES    CRITICAL CARE TIME SPENT: 35 minutes

## 2019-04-01 NOTE — PROGRESS NOTE ADULT - SUBJECTIVE AND OBJECTIVE BOX
INTERVAL HPI/OVERNIGHT EVENTS:    S/P Left Carotid endarterectomy 3/28, pt seen and examined at bedside. Offers no acute complaints at this time.   PO HTN medication discontinued, as pt was found to be hypotensive, currently requiring pressor support.       Vital Signs Last 24 Hrs  T(C): 36.8 (01 Apr 2019 06:00), Max: 36.9 (31 Mar 2019 16:00)  T(F): 98.2 (01 Apr 2019 06:00), Max: 98.5 (31 Mar 2019 16:00)  HR: 74 (01 Apr 2019 08:00) (53 - 74)  BP: 115/85 (01 Apr 2019 08:00) (108/48 - 157/64)  BP(mean): 91 (01 Apr 2019 08:00) (60 - 123)  RR: 19 (01 Apr 2019 08:00) (13 - 29)  SpO2: 97% (01 Apr 2019 08:00) (94% - 100%)  I&O's Detail    31 Mar 2019 07:01  -  01 Apr 2019 07:00  --------------------------------------------------------  IN:    Oral Fluid: 900 mL    phenylephrine   Infusion: 7 mL  Total IN: 907 mL    OUT:    Voided: 1160 mL  Total OUT: 1160 mL    Total NET: -253 mL        docusate sodium 100 milliGRAM(s) Oral two times a day PRN  senna 2 Tablet(s) Oral at bedtime      Physical Exam  General: A&Ox3. NAD.  HEENT: Dressing C/D/I. No surrounding fluctuance or erythema        Labs:                        12.1   8.98  )-----------( 195      ( 01 Apr 2019 06:53 )             37.5     04-01    141  |  112<H>  |  20<H>  ----------------------------<  98  3.8   |  23  |  0.93    Ca    8.4      01 Apr 2019 06:53  Phos  2.9     04-01  Mg     2.1     04-01    TPro  7.1  /  Alb  3.0<L>  /  TBili  0.6  /  DBili  x   /  AST  15  /  ALT  16  /  AlkPhos  73  03-31

## 2019-04-02 ENCOUNTER — TRANSCRIPTION ENCOUNTER (OUTPATIENT)
Age: 74
End: 2019-04-02

## 2019-04-02 VITALS
OXYGEN SATURATION: 99 % | SYSTOLIC BLOOD PRESSURE: 141 MMHG | DIASTOLIC BLOOD PRESSURE: 55 MMHG | TEMPERATURE: 98 F | HEART RATE: 61 BPM | RESPIRATION RATE: 16 BRPM

## 2019-04-02 LAB
GLUCOSE BLDC GLUCOMTR-MCNC: 137 MG/DL — HIGH (ref 70–99)
GLUCOSE BLDC GLUCOMTR-MCNC: 89 MG/DL — SIGNIFICANT CHANGE UP (ref 70–99)

## 2019-04-02 PROCEDURE — 84100 ASSAY OF PHOSPHORUS: CPT

## 2019-04-02 PROCEDURE — 84484 ASSAY OF TROPONIN QUANT: CPT

## 2019-04-02 PROCEDURE — 36415 COLL VENOUS BLD VENIPUNCTURE: CPT

## 2019-04-02 PROCEDURE — 85027 COMPLETE CBC AUTOMATED: CPT

## 2019-04-02 PROCEDURE — 80061 LIPID PANEL: CPT

## 2019-04-02 PROCEDURE — C1768: CPT

## 2019-04-02 PROCEDURE — 82550 ASSAY OF CK (CPK): CPT

## 2019-04-02 PROCEDURE — 83036 HEMOGLOBIN GLYCOSYLATED A1C: CPT

## 2019-04-02 PROCEDURE — 86901 BLOOD TYPING SEROLOGIC RH(D): CPT

## 2019-04-02 PROCEDURE — C1889: CPT

## 2019-04-02 PROCEDURE — 80053 COMPREHEN METABOLIC PANEL: CPT

## 2019-04-02 PROCEDURE — 80048 BASIC METABOLIC PNL TOTAL CA: CPT

## 2019-04-02 PROCEDURE — 82962 GLUCOSE BLOOD TEST: CPT

## 2019-04-02 PROCEDURE — 86850 RBC ANTIBODY SCREEN: CPT

## 2019-04-02 PROCEDURE — 85610 PROTHROMBIN TIME: CPT

## 2019-04-02 PROCEDURE — 97161 PT EVAL LOW COMPLEX 20 MIN: CPT

## 2019-04-02 PROCEDURE — 86900 BLOOD TYPING SEROLOGIC ABO: CPT

## 2019-04-02 PROCEDURE — 82553 CREATINE MB FRACTION: CPT

## 2019-04-02 PROCEDURE — 83735 ASSAY OF MAGNESIUM: CPT

## 2019-04-02 RX ADMIN — LOSARTAN POTASSIUM 25 MILLIGRAM(S): 100 TABLET, FILM COATED ORAL at 11:26

## 2019-04-02 RX ADMIN — ATENOLOL 25 MILLIGRAM(S): 25 TABLET ORAL at 05:06

## 2019-04-02 RX ADMIN — HEPARIN SODIUM 5000 UNIT(S): 5000 INJECTION INTRAVENOUS; SUBCUTANEOUS at 05:05

## 2019-04-02 RX ADMIN — Medication 81 MILLIGRAM(S): at 11:25

## 2019-04-02 NOTE — DISCHARGE NOTE NURSING/CASE MANAGEMENT/SOCIAL WORK - NSDCDPATPORTLINK_GEN_ALL_CORE
You can access the [facility] Patient Portal, offered by [facility], by registering with the following website: [email or web address]

## 2019-04-02 NOTE — DISCHARGE NOTE PROVIDER - HOSPITAL COURSE
73 yr old female with PMH of HTN, Hyperlipidemia, DM, thyroid nodules, PVD presents with c/o stenosis of left carotid artery discovered incidentally during work-up for thyroid nodules. Pt denies any symptoms as per surgery team. Pt received left carotid endarterectomy with electroencephalogram neuromonitoring on 3/28/2019. Patient was sent to ICU for post op monitoring. Estimated blood loss of 100ml and patient received 1L IV resuscitation during procedure. Patient had increased BP managed with gtt. Patient was weaned off drip. Downgraded to surgical floor. Patient for d/c home

## 2019-04-02 NOTE — PROGRESS NOTE ADULT - SUBJECTIVE AND OBJECTIVE BOX
s/p L CEA 3/28, POD #4      Patient examined at bedside, no complaints  No headaches, lightheadedness or dizziness  BP controlled  No nausea, no vomiting  Tolerating diet        T(F): 98.4 (04-02-19 @ 05:00), Max: 98.5 (04-01-19 @ 22:38)  HR: 57 (04-02-19 @ 05:00) (57 - 80)  BP: 123/45 (04-02-19 @ 05:00) (105/46 - 155/55)  RR: 16 (04-02-19 @ 05:00) (16 - 27)  SpO2: 100% (04-02-19 @ 05:00) (96% - 100%)  Wt(kg): --      04-01 @ 07:01  -  04-02 @ 07:00  --------------------------------------------------------  IN:    Oral Fluid: 200 mL  Total IN: 200 mL    OUT:    Voided: 500 mL  Total OUT: 500 mL    Total NET: -300 mL          Physical Exam  General: AAOx3, No acute distress  Skin: No jaundice, no icterus  Neck: small area of induration under steri strips which are clean and intact, no hematoma  Extremities: non edematous, no calf pain bilaterally

## 2019-04-02 NOTE — PROGRESS NOTE ADULT - PROVIDER SPECIALTY LIST ADULT
Critical Care
Vascular Surgery

## 2019-04-02 NOTE — DISCHARGE NOTE PROVIDER - NSDCCPCAREPLAN_GEN_ALL_CORE_FT
PRINCIPAL DISCHARGE DIAGNOSIS  Diagnosis: Carotid artery stenosis  Assessment and Plan of Treatment: Continue to take BP meds   Please follow up with Dr. Acosta within 1 week   Diet as tolerated   No heavy lifting or straining

## 2019-04-02 NOTE — DISCHARGE NOTE PROVIDER - CARE PROVIDER_API CALL
James Acosta)  Vascular Surgery  [address], [address]  [place] [address]  Phone: [phone]  Fax: [phone]  Follow Up Time: 1 week

## 2019-04-12 ENCOUNTER — APPOINTMENT (OUTPATIENT)
Dept: VASCULAR SURGERY | Facility: CLINIC | Age: 74
End: 2019-04-12
Payer: MEDICARE

## 2019-04-12 VITALS
HEART RATE: 61 BPM | HEIGHT: 62 IN | DIASTOLIC BLOOD PRESSURE: 65 MMHG | SYSTOLIC BLOOD PRESSURE: 148 MMHG | BODY MASS INDEX: 31.83 KG/M2 | WEIGHT: 173 LBS

## 2019-04-12 DIAGNOSIS — Z09 ENCOUNTER FOR FOLLOW-UP EXAMINATION AFTER COMPLETED TREATMENT FOR CONDITIONS OTHER THAN MALIGNANT NEOPLASM: ICD-10-CM

## 2019-04-12 PROCEDURE — 99024 POSTOP FOLLOW-UP VISIT: CPT

## 2019-04-12 NOTE — REASON FOR VISIT
[de-identified] : Status post left carotid endarterectomy. Incision is clean. Neurologically intact. Followup in 4 weeks for duplex.

## 2019-05-31 ENCOUNTER — APPOINTMENT (OUTPATIENT)
Dept: VASCULAR SURGERY | Facility: CLINIC | Age: 74
End: 2019-05-31
Payer: MEDICARE

## 2019-05-31 VITALS
BODY MASS INDEX: 31.28 KG/M2 | SYSTOLIC BLOOD PRESSURE: 105 MMHG | HEIGHT: 62 IN | HEART RATE: 60 BPM | TEMPERATURE: 98 F | DIASTOLIC BLOOD PRESSURE: 66 MMHG | WEIGHT: 170 LBS

## 2019-05-31 PROCEDURE — 93880 EXTRACRANIAL BILAT STUDY: CPT

## 2019-05-31 PROCEDURE — 99024 POSTOP FOLLOW-UP VISIT: CPT

## 2019-05-31 NOTE — REASON FOR VISIT
[de-identified] : Status post left carotid endarterectomy. Incision is clean. Neurologically intact. Duplex with no significant stenosis. Followup in 6 months.

## 2019-12-06 ENCOUNTER — APPOINTMENT (OUTPATIENT)
Dept: VASCULAR SURGERY | Facility: CLINIC | Age: 74
End: 2019-12-06
Payer: MEDICARE

## 2019-12-06 PROCEDURE — 99213 OFFICE O/P EST LOW 20 MIN: CPT

## 2019-12-06 PROCEDURE — 93880 EXTRACRANIAL BILAT STUDY: CPT

## 2019-12-06 NOTE — PHYSICAL EXAM
[JVD] : no jugular venous distention  [Normal Heart Sounds] : normal heart sounds [Normal Breath Sounds] : Normal breath sounds [2+] : left 2+ [Abdomen Masses] : No abdominal masses [Ankle Swelling (On Exam)] : not present [Skin Ulcer] : no ulcer [Oriented to Person] : oriented to person [Alert] : alert [Oriented to Place] : oriented to place

## 2019-12-06 NOTE — HISTORY OF PRESENT ILLNESS
[FreeTextEntry1] : Patient is a 74-year-old female status post left carotid endarterectomy. Patient doing well. No recent CVA or TIA. Patient currently on aspirin.

## 2020-08-21 ENCOUNTER — APPOINTMENT (OUTPATIENT)
Dept: VASCULAR SURGERY | Facility: CLINIC | Age: 75
End: 2020-08-21
Payer: MEDICARE

## 2020-08-21 VITALS — TEMPERATURE: 97.1 F

## 2020-08-21 PROCEDURE — 99212 OFFICE O/P EST SF 10 MIN: CPT

## 2020-08-21 PROCEDURE — 93880 EXTRACRANIAL BILAT STUDY: CPT

## 2020-08-21 NOTE — PHYSICAL EXAM
[JVD] : no jugular venous distention  [Normal Breath Sounds] : Normal breath sounds [Normal Heart Sounds] : normal heart sounds [2+] : left 2+ [Ankle Swelling (On Exam)] : not present [Abdomen Masses] : No abdominal masses [Alert] : alert [Skin Ulcer] : no ulcer [Oriented to Person] : oriented to person [Oriented to Place] : oriented to place

## 2021-02-26 ENCOUNTER — APPOINTMENT (OUTPATIENT)
Dept: VASCULAR SURGERY | Facility: CLINIC | Age: 76
End: 2021-02-26
Payer: MEDICARE

## 2021-02-26 VITALS — TEMPERATURE: 96.4 F

## 2021-02-26 PROCEDURE — 99213 OFFICE O/P EST LOW 20 MIN: CPT

## 2021-02-26 PROCEDURE — 93880 EXTRACRANIAL BILAT STUDY: CPT

## 2021-02-26 NOTE — PHYSICAL EXAM
[JVD] : no jugular venous distention  [Normal Breath Sounds] : Normal breath sounds [Normal Heart Sounds] : normal heart sounds [2+] : left 2+ [Ankle Swelling (On Exam)] : not present [Abdomen Masses] : No abdominal masses [Skin Ulcer] : no ulcer [Alert] : alert [Oriented to Person] : oriented to person [Oriented to Place] : oriented to place

## 2021-10-01 NOTE — PHYSICAL THERAPY INITIAL EVALUATION ADULT - PHYSICAL ASSIST/NONPHYSICAL ASSIST: GAIT, REHAB EVAL
What Is The Reason For Today's Visit?: Full Body Skin Examination
What Is The Reason For Today's Visit? (Being Monitored For X): concerning skin lesions on an annual basis
1 person assist

## 2022-02-03 NOTE — PACU DISCHARGE NOTE - NSCLINEINSERTRD_GEN_ALL_CORE
No
Price (Use Numbers Only, No Special Characters Or $): 0
Consent: Written consent obtained and the risks of skin tag removal was reviewed with the patient including but not limited to bleeding, pigmentary change, infection, pain, and remote possibility of scarring.
Removed With: scissors
Anesthesia Volume In Cc: 1
Detail Level: Detailed

## 2022-04-29 ENCOUNTER — APPOINTMENT (OUTPATIENT)
Dept: VASCULAR SURGERY | Facility: CLINIC | Age: 77
End: 2022-04-29
Payer: MEDICARE

## 2022-04-29 PROCEDURE — 99214 OFFICE O/P EST MOD 30 MIN: CPT

## 2022-04-29 PROCEDURE — 93880 EXTRACRANIAL BILAT STUDY: CPT

## 2022-04-29 NOTE — ASSESSMENT
[FreeTextEntry1] : Status post carotid endarterectomy. \par \par continue asa and plavix.\par Patient doing well. Followup in 12 months.

## 2022-11-28 NOTE — PHYSICAL THERAPY INITIAL EVALUATION ADULT - ORIENTATION, REHAB EVAL
oriented to person, place, time and situation Patient presenting with finger laceration, sutures placed by midlevel however left prior to my evaluation and prior to discharge paper work.

## 2023-04-21 ENCOUNTER — APPOINTMENT (OUTPATIENT)
Dept: VASCULAR SURGERY | Facility: CLINIC | Age: 78
End: 2023-04-21
Payer: MEDICARE

## 2023-04-21 PROCEDURE — 93880 EXTRACRANIAL BILAT STUDY: CPT

## 2023-04-21 PROCEDURE — 99214 OFFICE O/P EST MOD 30 MIN: CPT

## 2023-04-21 NOTE — ASSESSMENT
[FreeTextEntry1] : Status post carotid endarterectomy. \par \par continue asa and plavix.\par Patient doing well. Followup in 12 months.\par \par \par Patient complaining of cramps and cold feet specially at night every other night.  We will schedule the patient for PVRs with exercise in near future

## 2023-06-09 ENCOUNTER — APPOINTMENT (OUTPATIENT)
Dept: VASCULAR SURGERY | Facility: CLINIC | Age: 78
End: 2023-06-09
Payer: MEDICARE

## 2023-06-09 VITALS
BODY MASS INDEX: 30.36 KG/M2 | WEIGHT: 165 LBS | HEIGHT: 62 IN | SYSTOLIC BLOOD PRESSURE: 152 MMHG | HEART RATE: 65 BPM | DIASTOLIC BLOOD PRESSURE: 95 MMHG

## 2023-06-09 PROCEDURE — 99214 OFFICE O/P EST MOD 30 MIN: CPT

## 2023-06-09 PROCEDURE — 93924 LWR XTR VASC STDY BILAT: CPT

## 2023-06-09 NOTE — ASSESSMENT
[FreeTextEntry1] : Status post carotid endarterectomy. \par \par continue asa and plavix.\par Patient doing well. Followup in 12 months.\par \par \par Arterial Dopplers show mild to moderate disease.  Patient at this time has no significant complaints of lower extremities.  We will continue to follow-up the patient conservatively

## 2023-07-17 NOTE — H&P PST ADULT - PRO ANTICIPATED DISCH DISP
1526 Patient arrived from OR to PACU 7. Connected to monitor and report received from anesthesia and RN. VSS. 10L 02 via mask with oral airway in place. Breaths calm, even, unlabored.     Incision closed with Dermabond to anterior neck; open to air with bacitracin ointment. No drainage noted.     Verbal order received from Dr. Tolbert to check blood sugar. FSBS 96.      1536: Oral airway removed.     1600: Updated pt's daughter via phone.     1607: Hydralazine given for     1622: Subsequent dose of hydralazine given for  per mar    1650: Pt medicated for pain 6/10    1700: PIV in R hand no longer flushing; removed. New PIV inserted.     1710: Pt medicated for pain 6/10    1714: Subsesquent dose hydralazine given for DBP 99 per mar    1723: Pt states difficulty taking full breath. Lung sounds clear. Breathing treatment given per mar.    1800: Pt moved in to recliner chair. Pt given IS and education on how to use it. Pt on 2 L nasal cannula.     1820: Pt on room air.     1848: Notified Dr. Tolbert of pt's O2 sats dipping into upper 80s. Pt has home O2. Ok to discharge pt home with instructions to use IS on car ride home and wear O2 when she gets home per Dr. Tolbert.     1900: Discharge instructions provided to pt and pt's daughter.     1905: Pt meets discharge criteria. PIV removed intact. Pt escorted out with all personal belongings via wheelchair by RN.                           
home

## 2024-01-25 NOTE — H&P PST ADULT - DOES THIS PATIENT HAVE A HISTORY OF OR HAS BEEN DX WITH HEART FAILURE?
[facility] PRE-ADMISSION TESTING INSTRUCTIONS      ARRIVAL INSTRUCTIONS:  [x] Parking the day of Surgery is located in the Main Entrance lot.  Upon entering the main door make an immediate right to the surgery reception desk.    [x] Bring photo ID and insurance card    [] Bring in a copy of Living will or Durable Power of  papers.    [x] Please be sure to arrange for responsible adult to provide transportation to and from the hospital    [x] Please arrange for responsible adult to be with you for the 24 hour period post procedure due to having anesthesia    [x] If you awake am of surgery not feeling well or have temperature >100 please call [phone]    GENERAL INSTRUCTIONS:    [x] Nothing by mouth after midnight, including gum, candy, mints or water    [x] You may brush your teeth, but do not swallow any water    [x] Take medications as instructed with 1-2 oz of water    [x] Stop herbal supplements and vitamins 5 days prior to procedure    [x] Follow preop dosing of blood thinners per physician instructions    [] Take 1/2 dose of evening insulin, but no insulin after midnight    [] No oral diabetic medications after midnight    [] If diabetic and have low blood sugar or feel symptomatic, take 1-2oz apple juice only    [] Bring inhalers day of surgery    [] Bring C-PAP/ Bi-Pap day of surgery    [] Bring urine specimen day of surgery    [x] Shower or bath with soap, lather and rinse well, AM of Surgery, no lotion, powders or creams to surgical site    [x] Follow bowel prep as instructed per surgeon    [x] No tobacco products within 24 hours of surgery     [x] No alcohol or illegal drug use within 24 hours of surgery.    [x] Jewelry, body piercing's, eyeglasses, contact lenses and dentures are not permitted into surgery (bring cases)      [x] Please do not wear any nail polish, make up or hair products on the day of surgery    [x] You can expect a call the business day prior to  no

## 2024-07-19 ENCOUNTER — APPOINTMENT (OUTPATIENT)
Dept: VASCULAR SURGERY | Facility: CLINIC | Age: 79
End: 2024-07-19
Payer: MEDICARE

## 2024-07-19 VITALS
DIASTOLIC BLOOD PRESSURE: 73 MMHG | HEIGHT: 62 IN | WEIGHT: 162 LBS | HEART RATE: 71 BPM | SYSTOLIC BLOOD PRESSURE: 178 MMHG | OXYGEN SATURATION: 97 % | BODY MASS INDEX: 29.81 KG/M2

## 2024-07-19 PROCEDURE — 93924 LWR XTR VASC STDY BILAT: CPT

## 2024-07-19 PROCEDURE — 93880 EXTRACRANIAL BILAT STUDY: CPT

## 2024-07-19 PROCEDURE — 99214 OFFICE O/P EST MOD 30 MIN: CPT

## 2024-07-19 PROCEDURE — G2211 COMPLEX E/M VISIT ADD ON: CPT

## 2024-07-22 NOTE — PATIENT PROFILE ADULT - VISION (WITH CORRECTIVE LENSES IF THE PATIENT USUALLY WEARS THEM):
Normal vision: sees adequately in most situations; can see medication labels, newsprint Partially impaired: cannot see medication labels or newsprint, but can see obstacles in path, and the surrounding layout; can count fingers at arm's length Has The Growth Been Previously Biopsied?: has not been previously biopsied

## 2025-09-12 ENCOUNTER — APPOINTMENT (OUTPATIENT)
Dept: VASCULAR SURGERY | Facility: CLINIC | Age: 80
End: 2025-09-12
Payer: MEDICARE

## 2025-09-12 DIAGNOSIS — I65.29 OCCLUSION AND STENOSIS OF UNSPECIFIED CAROTID ARTERY: ICD-10-CM

## 2025-09-12 DIAGNOSIS — I73.9 PERIPHERAL VASCULAR DISEASE, UNSPECIFIED: ICD-10-CM

## 2025-09-12 PROCEDURE — 99214 OFFICE O/P EST MOD 30 MIN: CPT

## 2025-09-12 PROCEDURE — 93880 EXTRACRANIAL BILAT STUDY: CPT

## 2025-09-12 PROCEDURE — 93924 LWR XTR VASC STDY BILAT: CPT
